# Patient Record
Sex: FEMALE | Race: WHITE | URBAN - METROPOLITAN AREA
[De-identification: names, ages, dates, MRNs, and addresses within clinical notes are randomized per-mention and may not be internally consistent; named-entity substitution may affect disease eponyms.]

---

## 2018-05-01 ENCOUNTER — TELEPHONE (OUTPATIENT)
Dept: OTHER | Facility: CLINIC | Age: 58
End: 2018-05-01

## 2025-01-30 ENCOUNTER — APPOINTMENT (OUTPATIENT)
Dept: ULTRASOUND IMAGING | Facility: CLINIC | Age: 65
DRG: 392 | End: 2025-01-30
Attending: EMERGENCY MEDICINE

## 2025-01-30 ENCOUNTER — APPOINTMENT (OUTPATIENT)
Dept: CT IMAGING | Facility: CLINIC | Age: 65
DRG: 392 | End: 2025-01-30
Attending: EMERGENCY MEDICINE

## 2025-01-30 ENCOUNTER — HOSPITAL ENCOUNTER (INPATIENT)
Facility: CLINIC | Age: 65
End: 2025-01-30
Attending: EMERGENCY MEDICINE | Admitting: STUDENT IN AN ORGANIZED HEALTH CARE EDUCATION/TRAINING PROGRAM

## 2025-01-30 VITALS
SYSTOLIC BLOOD PRESSURE: 177 MMHG | OXYGEN SATURATION: 95 % | RESPIRATION RATE: 18 BRPM | DIASTOLIC BLOOD PRESSURE: 97 MMHG | HEART RATE: 74 BPM | WEIGHT: 171.8 LBS | TEMPERATURE: 97.9 F | HEIGHT: 61 IN | BODY MASS INDEX: 32.44 KG/M2

## 2025-01-30 DIAGNOSIS — K57.20 DIVERTICULITIS OF COLON WITH PERFORATION: ICD-10-CM

## 2025-01-30 DIAGNOSIS — K63.9 COLON WALL THICKENING: ICD-10-CM

## 2025-01-30 DIAGNOSIS — I10 HYPERTENSION, UNSPECIFIED TYPE: Primary | ICD-10-CM

## 2025-01-30 LAB
ALBUMIN SERPL BCG-MCNC: 3.7 G/DL (ref 3.5–5.2)
ALBUMIN UR-MCNC: NEGATIVE MG/DL
ALP SERPL-CCNC: 104 U/L (ref 40–150)
ALT SERPL W P-5'-P-CCNC: 21 U/L (ref 0–50)
ANION GAP SERPL CALCULATED.3IONS-SCNC: 12 MMOL/L (ref 7–15)
APPEARANCE UR: CLEAR
AST SERPL W P-5'-P-CCNC: 26 U/L (ref 0–45)
BACTERIA #/AREA URNS HPF: ABNORMAL /HPF
BASOPHILS # BLD AUTO: 0.1 10E3/UL (ref 0–0.2)
BASOPHILS NFR BLD AUTO: 1 %
BILIRUB SERPL-MCNC: 0.4 MG/DL
BILIRUB UR QL STRIP: NEGATIVE
BUN SERPL-MCNC: 9.2 MG/DL (ref 8–23)
CALCIUM SERPL-MCNC: 9.4 MG/DL (ref 8.8–10.4)
CHLORIDE SERPL-SCNC: 94 MMOL/L (ref 98–107)
COLOR UR AUTO: YELLOW
CREAT SERPL-MCNC: 0.69 MG/DL (ref 0.51–0.95)
EGFRCR SERPLBLD CKD-EPI 2021: >90 ML/MIN/1.73M2
EOSINOPHIL # BLD AUTO: 0.1 10E3/UL (ref 0–0.7)
EOSINOPHIL NFR BLD AUTO: 1 %
ERYTHROCYTE [DISTWIDTH] IN BLOOD BY AUTOMATED COUNT: 13.2 % (ref 10–15)
GLUCOSE SERPL-MCNC: 107 MG/DL (ref 70–99)
GLUCOSE UR STRIP-MCNC: NEGATIVE MG/DL
HCO3 SERPL-SCNC: 28 MMOL/L (ref 22–29)
HCT VFR BLD AUTO: 41.7 % (ref 35–47)
HGB BLD-MCNC: 13.6 G/DL (ref 11.7–15.7)
HGB UR QL STRIP: NEGATIVE
HOLD SPECIMEN: NORMAL
HOLD SPECIMEN: NORMAL
IMM GRANULOCYTES # BLD: 0.1 10E3/UL
IMM GRANULOCYTES NFR BLD: 0 %
KETONES UR STRIP-MCNC: 40 MG/DL
LEUKOCYTE ESTERASE UR QL STRIP: NEGATIVE
LIPASE SERPL-CCNC: 16 U/L (ref 13–60)
LYMPHOCYTES # BLD AUTO: 2.7 10E3/UL (ref 0.8–5.3)
LYMPHOCYTES NFR BLD AUTO: 21 %
MCH RBC QN AUTO: 27.9 PG (ref 26.5–33)
MCHC RBC AUTO-ENTMCNC: 32.6 G/DL (ref 31.5–36.5)
MCV RBC AUTO: 86 FL (ref 78–100)
MONOCYTES # BLD AUTO: 1.4 10E3/UL (ref 0–1.3)
MONOCYTES NFR BLD AUTO: 11 %
MUCOUS THREADS #/AREA URNS LPF: PRESENT /LPF
NEUTROPHILS # BLD AUTO: 8.3 10E3/UL (ref 1.6–8.3)
NEUTROPHILS NFR BLD AUTO: 65 %
NITRATE UR QL: NEGATIVE
NRBC # BLD AUTO: 0 10E3/UL
NRBC BLD AUTO-RTO: 0 /100
PH UR STRIP: 6 [PH] (ref 5–7)
PLATELET # BLD AUTO: 383 10E3/UL (ref 150–450)
POTASSIUM SERPL-SCNC: 3.9 MMOL/L (ref 3.4–5.3)
PROT SERPL-MCNC: 7.3 G/DL (ref 6.4–8.3)
RADIOLOGIST FLAGS: ABNORMAL
RBC # BLD AUTO: 4.88 10E6/UL (ref 3.8–5.2)
RBC URINE: 3 /HPF
SODIUM SERPL-SCNC: 134 MMOL/L (ref 135–145)
SP GR UR STRIP: 1.02 (ref 1–1.03)
SQUAMOUS EPITHELIAL: 1 /HPF
UROBILINOGEN UR STRIP-MCNC: 2 MG/DL
WBC # BLD AUTO: 12.7 10E3/UL (ref 4–11)
WBC URINE: 2 /HPF

## 2025-01-30 PROCEDURE — 250N000011 HC RX IP 250 OP 636: Performed by: EMERGENCY MEDICINE

## 2025-01-30 PROCEDURE — 99207 PR APP CREDIT; MD BILLING SHARED VISIT: CPT | Mod: FS

## 2025-01-30 PROCEDURE — 74177 CT ABD & PELVIS W/CONTRAST: CPT

## 2025-01-30 PROCEDURE — 99285 EMERGENCY DEPT VISIT HI MDM: CPT | Mod: 25

## 2025-01-30 PROCEDURE — 83690 ASSAY OF LIPASE: CPT | Performed by: EMERGENCY MEDICINE

## 2025-01-30 PROCEDURE — 99223 1ST HOSP IP/OBS HIGH 75: CPT | Performed by: STUDENT IN AN ORGANIZED HEALTH CARE EDUCATION/TRAINING PROGRAM

## 2025-01-30 PROCEDURE — 96374 THER/PROPH/DIAG INJ IV PUSH: CPT | Mod: 59

## 2025-01-30 PROCEDURE — 85041 AUTOMATED RBC COUNT: CPT | Performed by: EMERGENCY MEDICINE

## 2025-01-30 PROCEDURE — 76705 ECHO EXAM OF ABDOMEN: CPT

## 2025-01-30 PROCEDURE — 36415 COLL VENOUS BLD VENIPUNCTURE: CPT | Performed by: EMERGENCY MEDICINE

## 2025-01-30 PROCEDURE — 85014 HEMATOCRIT: CPT | Performed by: EMERGENCY MEDICINE

## 2025-01-30 PROCEDURE — 120N000001 HC R&B MED SURG/OB

## 2025-01-30 PROCEDURE — 85004 AUTOMATED DIFF WBC COUNT: CPT | Performed by: EMERGENCY MEDICINE

## 2025-01-30 PROCEDURE — 99253 IP/OBS CNSLTJ NEW/EST LOW 45: CPT | Mod: FS | Performed by: COLON & RECTAL SURGERY

## 2025-01-30 PROCEDURE — 250N000011 HC RX IP 250 OP 636: Mod: JZ | Performed by: EMERGENCY MEDICINE

## 2025-01-30 PROCEDURE — 258N000003 HC RX IP 258 OP 636: Performed by: HOSPITALIST

## 2025-01-30 PROCEDURE — 250N000009 HC RX 250: Performed by: EMERGENCY MEDICINE

## 2025-01-30 PROCEDURE — 250N000013 HC RX MED GY IP 250 OP 250 PS 637: Performed by: STUDENT IN AN ORGANIZED HEALTH CARE EDUCATION/TRAINING PROGRAM

## 2025-01-30 PROCEDURE — 81001 URINALYSIS AUTO W/SCOPE: CPT | Performed by: EMERGENCY MEDICINE

## 2025-01-30 PROCEDURE — 80053 COMPREHEN METABOLIC PANEL: CPT | Performed by: EMERGENCY MEDICINE

## 2025-01-30 RX ORDER — CALCIUM CARBONATE 500 MG/1
1000 TABLET, CHEWABLE ORAL 4 TIMES DAILY PRN
Status: ACTIVE | OUTPATIENT
Start: 2025-01-30

## 2025-01-30 RX ORDER — SODIUM CHLORIDE, SODIUM LACTATE, POTASSIUM CHLORIDE, CALCIUM CHLORIDE 600; 310; 30; 20 MG/100ML; MG/100ML; MG/100ML; MG/100ML
INJECTION, SOLUTION INTRAVENOUS CONTINUOUS
Status: ACTIVE | OUTPATIENT
Start: 2025-01-30

## 2025-01-30 RX ORDER — ACETAMINOPHEN 325 MG/1
975 TABLET ORAL EVERY 8 HOURS PRN
Status: DISPENSED | OUTPATIENT
Start: 2025-01-30

## 2025-01-30 RX ORDER — IPRATROPIUM BROMIDE AND ALBUTEROL SULFATE 2.5; .5 MG/3ML; MG/3ML
3 SOLUTION RESPIRATORY (INHALATION) EVERY 4 HOURS PRN
Status: ACTIVE | OUTPATIENT
Start: 2025-01-30

## 2025-01-30 RX ORDER — ALBUTEROL SULFATE 90 UG/1
2 INHALANT RESPIRATORY (INHALATION) EVERY 6 HOURS PRN
COMMUNITY
End: 2025-01-30

## 2025-01-30 RX ORDER — LIDOCAINE 40 MG/G
CREAM TOPICAL
Status: ACTIVE | OUTPATIENT
Start: 2025-01-30

## 2025-01-30 RX ORDER — ONDANSETRON 2 MG/ML
4 INJECTION INTRAMUSCULAR; INTRAVENOUS ONCE
Status: COMPLETED | OUTPATIENT
Start: 2025-01-30 | End: 2025-01-30

## 2025-01-30 RX ORDER — IOPAMIDOL 755 MG/ML
500 INJECTION, SOLUTION INTRAVASCULAR ONCE
Status: COMPLETED | OUTPATIENT
Start: 2025-01-30 | End: 2025-01-30

## 2025-01-30 RX ORDER — MORPHINE SULFATE 4 MG/ML
4 INJECTION, SOLUTION INTRAMUSCULAR; INTRAVENOUS ONCE
Status: COMPLETED | OUTPATIENT
Start: 2025-01-30 | End: 2025-01-30

## 2025-01-30 RX ORDER — PIPERACILLIN SODIUM, TAZOBACTAM SODIUM 4; .5 G/20ML; G/20ML
4.5 INJECTION, POWDER, LYOPHILIZED, FOR SOLUTION INTRAVENOUS EVERY 6 HOURS
Status: DISPENSED | OUTPATIENT
Start: 2025-01-30

## 2025-01-30 RX ORDER — AMOXICILLIN 250 MG
2 CAPSULE ORAL 2 TIMES DAILY PRN
Status: ACTIVE | OUTPATIENT
Start: 2025-01-30

## 2025-01-30 RX ORDER — AMOXICILLIN 250 MG
1 CAPSULE ORAL 2 TIMES DAILY PRN
Status: ACTIVE | OUTPATIENT
Start: 2025-01-30

## 2025-01-30 RX ORDER — OXYCODONE HYDROCHLORIDE 5 MG/1
5 TABLET ORAL EVERY 4 HOURS PRN
Status: DISPENSED | OUTPATIENT
Start: 2025-01-30

## 2025-01-30 RX ADMIN — PIPERACILLIN AND TAZOBACTAM 4.5 G: 4; .5 INJECTION, POWDER, FOR SOLUTION INTRAVENOUS at 22:04

## 2025-01-30 RX ADMIN — ACETAMINOPHEN 975 MG: 325 TABLET, FILM COATED ORAL at 19:56

## 2025-01-30 RX ADMIN — SODIUM CHLORIDE, POTASSIUM CHLORIDE, SODIUM LACTATE AND CALCIUM CHLORIDE: 600; 310; 30; 20 INJECTION, SOLUTION INTRAVENOUS at 10:21

## 2025-01-30 RX ADMIN — PIPERACILLIN AND TAZOBACTAM 4.5 G: 4; .5 INJECTION, POWDER, FOR SOLUTION INTRAVENOUS at 05:39

## 2025-01-30 RX ADMIN — PIPERACILLIN AND TAZOBACTAM 4.5 G: 4; .5 INJECTION, POWDER, FOR SOLUTION INTRAVENOUS at 17:26

## 2025-01-30 RX ADMIN — OXYCODONE HYDROCHLORIDE 5 MG: 5 TABLET ORAL at 13:10

## 2025-01-30 RX ADMIN — PIPERACILLIN AND TAZOBACTAM 4.5 G: 4; .5 INJECTION, POWDER, FOR SOLUTION INTRAVENOUS at 09:41

## 2025-01-30 RX ADMIN — MORPHINE SULFATE 4 MG: 4 INJECTION, SOLUTION INTRAMUSCULAR; INTRAVENOUS at 02:09

## 2025-01-30 RX ADMIN — SODIUM CHLORIDE, POTASSIUM CHLORIDE, SODIUM LACTATE AND CALCIUM CHLORIDE: 600; 310; 30; 20 INJECTION, SOLUTION INTRAVENOUS at 22:47

## 2025-01-30 RX ADMIN — IOPAMIDOL 86 ML: 755 INJECTION, SOLUTION INTRAVENOUS at 03:21

## 2025-01-30 RX ADMIN — SODIUM CHLORIDE 62 ML: 9 INJECTION, SOLUTION INTRAVENOUS at 03:22

## 2025-01-30 ASSESSMENT — ACTIVITIES OF DAILY LIVING (ADL)
ADLS_ACUITY_SCORE: 41
ADLS_ACUITY_SCORE: 42
ADLS_ACUITY_SCORE: 42
ADLS_ACUITY_SCORE: 41
ADLS_ACUITY_SCORE: 41
ADLS_ACUITY_SCORE: 42
ADLS_ACUITY_SCORE: 42
ADLS_ACUITY_SCORE: 41
ADLS_ACUITY_SCORE: 18
ADLS_ACUITY_SCORE: 42
ADLS_ACUITY_SCORE: 42
ADLS_ACUITY_SCORE: 41
ADLS_ACUITY_SCORE: 18
ADLS_ACUITY_SCORE: 42
ADLS_ACUITY_SCORE: 41
ADLS_ACUITY_SCORE: 42

## 2025-01-30 ASSESSMENT — COLUMBIA-SUICIDE SEVERITY RATING SCALE - C-SSRS
6. HAVE YOU EVER DONE ANYTHING, STARTED TO DO ANYTHING, OR PREPARED TO DO ANYTHING TO END YOUR LIFE?: NO
1. IN THE PAST MONTH, HAVE YOU WISHED YOU WERE DEAD OR WISHED YOU COULD GO TO SLEEP AND NOT WAKE UP?: NO
1. IN THE PAST MONTH, HAVE YOU WISHED YOU WERE DEAD OR WISHED YOU COULD GO TO SLEEP AND NOT WAKE UP?: NO
2. HAVE YOU ACTUALLY HAD ANY THOUGHTS OF KILLING YOURSELF IN THE PAST MONTH?: NO
6. HAVE YOU EVER DONE ANYTHING, STARTED TO DO ANYTHING, OR PREPARED TO DO ANYTHING TO END YOUR LIFE?: NO
2. HAVE YOU ACTUALLY HAD ANY THOUGHTS OF KILLING YOURSELF IN THE PAST MONTH?: NO

## 2025-01-30 NOTE — ED NOTES
[facility]  ED Nurse Handoff Report    ED Chief complaint: Abdominal Pain  . ED Diagnosis:   Final diagnoses:   Diverticulitis of colon with perforation   Colon wall thickening - Needs follow up for mass.        Allergies:   Allergies   Allergen Reactions    Codeine        Code Status: Full Code    Activity level - Baseline/Home:  independent.  Activity Level - Current:   assist of 1.   Lift room needed: No.   Bariatric: No   Needed: No   Isolation: No.   Infection: Not Applicable.     Respiratory status: Room air    Vital Signs (within 30 minutes):   Vitals:    01/30/25 0013 01/30/25 0310   BP: (!) 170/107 (!) 179/88   Pulse: 95 87   Resp: 20    Temp: 98  F (36.7  C)    TempSrc: Temporal    SpO2: 98% 94%   Weight: 78.2 kg (172 lb 6.4 oz)        Cardiac Rhythm:  ,      Pain level:    Patient confused: No.   Patient Falls Risk: nonskid shoes/slippers when out of bed, patient and family education, and activity supervised.   Elimination Status: Has voided     Patient Report - Initial Complaint: Abdominal Pain.   Focused Assessment:  Jacqueline A Goodell is a 64 year old female with history as noted below who presents to the ED with her spouse for evaluation of abdominal pain. The patient reports she felt constipated one week ago (1/23/25) and took Senokot which did relieve the constipation. After this, she developed diffuse abdominal pain and states her abdomen was alternating between feeling hard and soft. She notes the pain is worst in the RUQ. Debbie states she cannot eat or drink due to persistent pain. She denies vomiting, bloody stools, diarrhea, urinary symptoms, or previous abdominal surgeries.          Abnormal Results:   Labs Ordered and Resulted from Time of ED Arrival to Time of ED Departure   COMPREHENSIVE METABOLIC PANEL - Abnormal       Result Value    Sodium 134 (*)     Potassium 3.9      Carbon Dioxide (CO2) 28      Anion Gap 12      Urea Nitrogen 9.2       Creatinine 0.69      GFR Estimate >90      Calcium 9.4      Chloride 94 (*)     Glucose 107 (*)     Alkaline Phosphatase 104      AST 26      ALT 21      Protein Total 7.3      Albumin 3.7      Bilirubin Total 0.4     CBC WITH PLATELETS AND DIFFERENTIAL - Abnormal    WBC Count 12.7 (*)     RBC Count 4.88      Hemoglobin 13.6      Hematocrit 41.7      MCV 86      MCH 27.9      MCHC 32.6      RDW 13.2      Platelet Count 383      % Neutrophils 65      % Lymphocytes 21      % Monocytes 11      % Eosinophils 1      % Basophils 1      % Immature Granulocytes 0      NRBCs per 100 WBC 0      Absolute Neutrophils 8.3      Absolute Lymphocytes 2.7      Absolute Monocytes 1.4 (*)     Absolute Eosinophils 0.1      Absolute Basophils 0.1      Absolute Immature Granulocytes 0.1      Absolute NRBCs 0.0     LIPASE - Normal    Lipase 16     ROUTINE UA WITH MICROSCOPIC        CT Abdomen Pelvis w Contrast   Preliminary Result   Abnormal   IMPRESSION:    1.  Acute sigmoid diverticulitis with a small intramural phlegmon, not amenable to percutaneous drainage. No mechanical bowel obstruction, free gas, free fluid or abscess.      2.  Short segment distal transverse colonic thickening, worrisome for an infiltrative primary colonic mass. This should be evaluated further with endoscopy following resolution of acute sigmoid diverticulitis.      3.  Tiny hepatic cysts or hemangiomas in the right hepatic dome, these require no specific follow-up. Hypodense right hepatic lobe cyst seen previously has resolved. Distended gallbladder without stones, biliary dilatation or acute inflammatory changes.      4.  Slight left lower lobe infiltrate medially adjacent to the descending thoracic aorta, possibly an infectious or an inflammatory process.      5.  Findings discussed with the referring physician, Dr. Cat, immediately following the study at 0350 hours.         [Critical Result: Acute sigmoid diverticulitis with an intramural small phlegmon,  not amenable to percutaneous drainage. Thickening of the distal transverse colon, worrisome for a primary colonic mass. This should be evaluated further with endoscopy    following resolution of acute sigmoid diverticulitis.]      Finding was identified on 1/30/2025 3:30 AM CST.       Dr. Cat was contacted by me on 1/30/2025 3:46 AM CST and verbalized understanding of the critical result.       US Abdomen Limited   Final Result   IMPRESSION:   1.  Mild cholelithiasis with no pericholecystic fluid, positive Moody's sign, or wall thickening.      2.  Normal caliber bile ducts.      3.  The liver, right kidney, and visualized portions the pancreas are normal.                Treatments provided: See MAR  Family Comments: NA  OBS brochure/video discussed/provided to patient:  N/A  ED Medications:   Medications   piperacillin-tazobactam (ZOSYN) 4.5 g vial to attach to  mL bag (has no administration in time range)   morphine (PF) injection 4 mg (4 mg Intravenous $Given 1/30/25 0209)   ondansetron (ZOFRAN) injection 4 mg (4 mg Intravenous Not Given 1/30/25 0357)   CT Scan Flush (62 mLs Intravenous $Given 1/30/25 0322)   iopamidol (ISOVUE-370) solution 500 mL (86 mLs Intravenous $Given 1/30/25 0321)       Drips infusing:  No  For the majority of the shift this patient was Green.   Interventions performed were NA.    Sepsis treatment initiated: No    Cares/treatment/interventions/medications to be completed following ED care: See Orders    ED Nurse Name: Rochelle Mortensen RN  3:59 AM          RECEIVING UNIT ED HANDOFF REVIEW    Above ED Nurse Handoff Report was reviewed: Yes  Reviewed by: Parth Rebolledo RN on January 30, 2025 at 9:00 PM   KAREN Shaw called the ED to inform them the note was read: No

## 2025-01-30 NOTE — PHARMACY-ADMISSION MEDICATION HISTORY
Pharmacist Admission Medication History    Admission medication history is complete. The information provided in this note is only as accurate as the sources available at the time of the update.    Information Source(s): Patient and CareEverywhere/SureScripts via in-person    Pertinent Information: Patient does not have an albuterol inhaler at home so I removed this from her PTA med list as she is not taking currently taking this.     Changes made to PTA medication list:  Added: None  Deleted: albuterol HFA  Changed: None    Allergies reviewed with patient and updates made in EHR: no    Medication History Completed By: SAILAJA POTTER RPH 1/30/2025 8:45 AM    No outpatient medications have been marked as taking for the 1/30/25 encounter (Hospital Encounter).

## 2025-01-30 NOTE — ED NOTES
[facility]    ED Boarding Nurse Handoff Addendum Report:    Date/time: 1/30/2025, 1:18 PM    Activity Level: standby    Fall Risk: Yes:  activity supervised    Active Infusions: LR @ 100 ml/hr    Current Meds Due: N/A    Current care needs: Would like a room    Oxygen requirements (liters/min and/or FiO2): N/A    Respiratory status: Room air    Vital signs (within last 30 minutes):    Vitals:    01/30/25 0618 01/30/25 0940 01/30/25 0945 01/30/25 1207   BP: (!) 172/95 (!) 166/99  (!) 166/93   BP Location: Left arm      Pulse: 85 79  85   Resp: 22      Temp: 98.1  F (36.7  C)      TempSrc: Oral      SpO2: 96%  93% 97%   Weight:           ED Boarding Nurse name: Mariela Armenta RN

## 2025-01-30 NOTE — CONSULTS
[facility]   Consultation      Place of Service: [facility]  Reason for Consultation:Sigmoid diverticulitis with phlegmon, possible infiltrative colon mass   Consult Requested by: Dr. Bart Gorman    History of Present Illness: Jacqueline A Goodell is a 63 yo F with a history of smoking, diverticulitis in the past who presents for evaluation of abdominal pain. She recalled diffuse abdominal pain that began 1 week ago after taking senna for constipation. She notes that the pain is intermittent. She denies any nausea or vomiting. Poor oral intake for the past week. She presented to the ED and was found to be vitally stable although with blood pressure 170/107. CT showed acute sigmoid diverticulitis with a small intramural phlegmon, short segment distal transverse colonic thickening concerning for infiltrative primary colonic mass. Labs significant for WBC 12.7, hemoglobin 13.6, platelet 383, sodium 134, chloride 94, creatinine 0.69. She was admitted and started on IV zosyn and admitted.     Today, she reports that she is feeling much better since she was admitted. Her last bowel movement was about a week ago. She is concerned as she does not have insurance.     She has never had a colonoscopy.    No prior abdominal surgery.  She has a history of diverticulitis which was over a decade ago.    Pertinent Labs:   Lab Results: personally reviewed   Lab Results   Component Value Date     01/30/2025     06/17/2011    CO2 28 01/30/2025    CO2 28 06/17/2011    BUN 9.2 01/30/2025    BUN 15 06/17/2011     Lab Results   Component Value Date    WBC 12.7 01/30/2025    WBC 7.9 06/17/2011    HGB 13.6 01/30/2025    HGB 11.1 06/17/2011    HCT 41.7 01/30/2025    HCT 34.0 06/17/2011    MCV 86 01/30/2025    MCV 89 06/17/2011     01/30/2025     06/17/2011       Pertinent Radiology:     EXAM: CT ABDOMEN PELVIS W CONTRAST  LOCATION: [facility]  DATE:  1/30/2025     INDICATION: Right sided abdominal pain, leukocytosis, gallstones seen on ultrasound.  COMPARISON:   1. Ultrasound abdomen limited 1/30/2025.  2. CT abdomen and pelvis with IV contrast 6/17/2011.  TECHNIQUE: CT scan of the abdomen and pelvis was performed following injection of IV contrast. Multiplanar reformats were obtained. Dose reduction techniques were used.  CONTRAST: 86 mL Isovue 370.     FINDINGS:   LOWER CHEST: Slight left lower lobe infiltrate medially adjacent to the descending thoracic aorta. No pleural effusion on either side. Normal cardiac size. No pericardial effusion.     HEPATOBILIARY: Focal fat in the usual location in the left hepatic lobe anteroinferiorly. A few tiny cysts or hemangiomas in the right hepatic dome, no specific follow-up required. Resolved hypodense cyst in the right hepatic lobe seen previously.   Distended gallbladder without calcified stones, biliary dilatation or adjacent acute inflammatory changes. Patent hepatic and portal veins.     PANCREAS: Normal.     SPLEEN: Normal.     ADRENAL GLANDS: Normal.     KIDNEYS/BLADDER: No urinary tract calculi. Both kidneys are well-perfused without hydronephrosis or hydroureter. Normal urinary bladder.     BOWEL: Acute sigmoid diverticulitis (images 137-154, series 3), with a small intramural phlegmon measuring 2.1 x 0.7 cm (image 61, series 4, images 74-76, series 5, images 146-148, series 3), not amenable to percutaneous drainage. There is a short   segment distal transverse colonic thickening (images 23-35, series 4, images , series 3), worrisome for an infiltrative primary colonic mass. Moderate amount of formed stool material within normal caliber colon. Normal appendix. Tiny hiatal hernia.     LYMPH NODES: A few small retroperitoneal nodes, a representative node measures 1.3 x 0.8 cm (image 88, series 3). No suspicious abdominopelvic adenopathy.     VASCULATURE: Atherosclerotic normal caliber distal abdominal aorta  measuring 1.7 x 1.7 cm (image 89, series 3). Normal caliber IVC.     PELVIC ORGANS: Acute sigmoid diverticulitis with a small intramural phlegmon. Anteverted postmenopausal uterus. No adenopathy or free fluid.     MUSCULOSKELETAL: Degenerative changes involving the spine and joints of the pelvis. Small fat-containing ventral umbilical hernia.                                                                      IMPRESSION:   1.  Acute sigmoid diverticulitis with a small intramural phlegmon, not amenable to percutaneous drainage. No mechanical bowel obstruction, free gas, free fluid or abscess.     2.  Short segment distal transverse colonic thickening, worrisome for an infiltrative primary colonic mass. This should be evaluated further with endoscopy following resolution of acute sigmoid diverticulitis.     3.  Tiny hepatic cysts or hemangiomas in the right hepatic dome, these require no specific follow-up. Hypodense right hepatic lobe cyst seen previously has resolved. Distended gallbladder without stones, biliary dilatation or acute inflammatory changes.     4.  Slight left lower lobe infiltrate medially adjacent to the descending thoracic aorta, possibly an infectious or an inflammatory process.     5.  Findings discussed with the referring physician, Dr. Cat, immediately following the study at 0350 hours.        [Critical Result: Acute sigmoid diverticulitis with an intramural small phlegmon, not amenable to percutaneous drainage. Thickening of the distal transverse colon, worrisome for a primary colonic mass. This should be evaluated further with endoscopy   following resolution of acute sigmoid diverticulitis.]     Finding was identified on 1/30/2025 3:30 AM CST.            Past Medical History:   Diagnosis Date    Asthma     Cholelithiasis     Depressive disorder     Diverticular disease of colon        Past Surgical History:   Procedure Laterality Date    MAMMOPLASTY AUGMENTATION      Tubal Ligation NOS  Bilateral        No family history on file.    Social History     Socioeconomic History    Marital status:      Spouse name: Not on file    Number of children: Not on file    Years of education: Not on file    Highest education level: Not on file   Occupational History    Not on file   Tobacco Use    Smoking status: Not on file    Smokeless tobacco: Not on file   Substance and Sexual Activity    Alcohol use: Not on file    Drug use: Not on file    Sexual activity: Not on file   Other Topics Concern    Not on file   Social History Narrative    Not on file     Social Drivers of Health     Financial Resource Strain: Not on file   Food Insecurity: Not on file   Transportation Needs: Not on file   Physical Activity: Not on file   Stress: Not on file   Social Connections: Not on file   Interpersonal Safety: Not on file   Housing Stability: Not on file       Current Facility-Administered Medications   Medication Dose Route Frequency Provider Last Rate Last Admin    acetaminophen (TYLENOL) tablet 975 mg  975 mg Oral Q8H PRN Kianna Guzman MD        calcium carbonate (TUMS) chewable tablet 1,000 mg  1,000 mg Oral 4x Daily PRN Kianna Guzman MD        ipratropium - albuterol 0.5 mg/2.5 mg/3 mL (DUONEB) neb solution 3 mL  3 mL Nebulization Q4H PRN Kianna Guzman MD        lactated ringers infusion   Intravenous Continuous Bart Gorman  mL/hr at 01/30/25 1021 New Bag at 01/30/25 1021    lidocaine (LMX4) cream   Topical Q1H PRKianna Tena MD        lidocaine 1 % 0.1-1 mL  0.1-1 mL Other Q1H Kianna Crawford MD        oxyCODONE (ROXICODONE) tablet 5 mg  5 mg Oral Q4H PRKianna Tena MD        piperacillin-tazobactam (ZOSYN) 4.5 g vial to attach to  mL bag  4.5 g Intravenous Q6H Bart Cat MD   Stopped at 01/30/25 1021    senna-docusate (SENOKOT-S/PERICOLACE) 8.6-50 MG per tablet 1 tablet  1 tablet Oral BID PRKianna Tena MD        Or    senna-docusate (SENOKOT-S/PERICOLACE) 8.6-50 MG per tablet 2 tablet  2  tablet Oral BID PRN Kianna Guzman MD        sodium chloride (PF) 0.9% PF flush 3 mL  3 mL Intracatheter Q8H Kianna Guzman MD   3 mL at 01/30/25 0537    sodium chloride (PF) 0.9% PF flush 3 mL  3 mL Intracatheter q1 min prn Kianna Guzman MD         No current outpatient medications on file.       Allergies   Allergen Reactions    Codeine          Intake/Output past 24 hrs:  No intake or output data in the 24 hours ending 01/30/25 1048    Physical Exam:  Temp:  [98  F (36.7  C)-98.1  F (36.7  C)] 98.1  F (36.7  C)  Pulse:  [79-95] 79  Resp:  [20-22] 22  BP: (166-179)/() 166/99  SpO2:  [93 %-98 %] 93 %    General: NAD, alert, cooperative  Head: normocephalic, without abnormality / atraumatic  Respiratory: non-labored breathing  Abdomen: soft, minimal tenderness, non-distended, round belly.  No guarding or rebound  Perianal/Rectal:  Skin: no rashes or lesions  Musculoskeletal: moves all four extremities equally; no calf edema or tenderness  Psychological: alert and oriented, answers questions appropriately  Neurological: cranial nerves grossly intact    Assessment/Plan: Jacqueline A Goodell is a 63 yo F with a history of smoking, diverticulitis in the past who presents for evaluation of abdominal pain. CT showed acute sigmoid diverticulitis with a small intramural phlegmon, short segment distal transverse colonic thickening concerning for infiltrative primary colonic mass. Labs significant for WBC 12.7, hemoglobin 13.6, platelet 383, sodium 134, chloride 94, creatinine 0.69. She was started on IV zosyn and admitted. Recommend continuing with conservative medical management including bowel rest, IVF, and IV antibiotics. Briefly discussed that if she did not improve or were to acutely worsen she may require more urgent surgery which would likely entail a sigmoid resection with a possible temporary stoma. Regardless would recommend a colonoscopy in 6-8 weeks given concern for primary colonic mass on CT scan. Social work  consult to assist. Continue supportive cares. We will continue to follow along.        Plan:  Admit to hospitalist  Surgery: No emergent surgery indicated  Diet: Clear liquids  IV Fluids: Per hospitalist  Antibiotics:  IV Zosyn  Medications: Continue home meds per hospitalist  I&O s:  strict I&O s  Labs:   - Reviewed  - Ordered: None   Imaging:   - Dr. Gutierrez and myself have personally viewed: CT abd/pelvis  - Ordered:  None  Activity: ambulate as tolerated, encourage OOB  DVT prophylaxis: SCD s  This plan has been discussed with Dr. Gutierrez    Patient specific identified risk factors considered as part of today s evaluation include: recurrent diverticulitis, socioeconomic status, smoker      Additional history obtained from the chart and patient.  Time spent on consultation: 49 minutes minutes.    Clinically Significant Risk Factors Present on Admission         # Hyponatremia: Lowest Na = 134 mmol/L in last 2 days, will monitor as appropriate  # Hypochloremia: Lowest Cl = 94 mmol/L in last 2 days, will monitor as appropriate                             Frieda Ac PA-C  [facility]  Phone:  [phone]

## 2025-01-30 NOTE — ED PROVIDER NOTES
Emergency Department Note      History of Present Illness     Chief Complaint   Abdominal Pain    HPI   Jacqueline A Goodell is a 64 year old female with history as noted below who presents to the ED with her spouse for evaluation of abdominal pain. The patient reports she felt constipated one week ago (1/23/25) and took Senokot which did relieve the constipation. After this, she developed diffuse abdominal pain and states her abdomen was alternating between feeling hard and soft. She notes the pain is worst in the RUQ. Debbie states she cannot eat or drink due to persistent pain. She denies vomiting, bloody stools, diarrhea, urinary symptoms, or previous abdominal surgeries.       Independent Historian   None    Review of External Notes   I reviewed Care Everywhere and updated Epic.     Past Medical History     Medical History and Problem List   Past Medical History:   Diagnosis Date    Asthma     Depressive disorder      Medications   albuterol (PROAIR HFA/PROVENTIL HFA/VENTOLIN HFA) 108 (90 Base) MCG/ACT inhaler        Surgical History   Past Surgical History:   Procedure Laterality Date    MAMMOPLASTY AUGMENTATION      Tubal Ligation NOS Bilateral        Physical Exam     Patient Vitals for the past 24 hrs:   BP Temp Temp src Pulse Resp SpO2 Weight   01/30/25 0310 179/88 -- -- 87 -- 94 % --   01/30/25 0013 170/107 98  F (36.7  C) Temporal 95 20 98 % 78.2 kg (172 lb 6.4 oz)     Physical Exam  Nursing note and vitals reviewed.  Constitutional: Cooperative.  Uncomfortable appearing  HENT:   Mouth/Throat: Mucous membranes are dry  Cardiovascular: Normal rate, regular rhythm and normal heart sounds.  No murmur.  Pulmonary/Chest: Effort normal and breath sounds normal. No respiratory distress. No wheezes. No rales.   Abdominal: Soft.  Slight distention.  Diffuse abdominal tenderness most prominent in the right upper quadrant.  No guarding or rigidity.  Neurological: Alert.  Oriented x 3  Skin: Skin is warm and  dry.   Psychiatric: Normal mood and affect.      Diagnostics     Lab Results   Labs Ordered and Resulted from Time of ED Arrival to Time of ED Departure   COMPREHENSIVE METABOLIC PANEL - Abnormal       Result Value    Sodium 134 (*)     Potassium 3.9      Carbon Dioxide (CO2) 28      Anion Gap 12      Urea Nitrogen 9.2      Creatinine 0.69      GFR Estimate >90      Calcium 9.4      Chloride 94 (*)     Glucose 107 (*)     Alkaline Phosphatase 104      AST 26      ALT 21      Protein Total 7.3      Albumin 3.7      Bilirubin Total 0.4     CBC WITH PLATELETS AND DIFFERENTIAL - Abnormal    WBC Count 12.7 (*)     RBC Count 4.88      Hemoglobin 13.6      Hematocrit 41.7      MCV 86      MCH 27.9      MCHC 32.6      RDW 13.2      Platelet Count 383      % Neutrophils 65      % Lymphocytes 21      % Monocytes 11      % Eosinophils 1      % Basophils 1      % Immature Granulocytes 0      NRBCs per 100 WBC 0      Absolute Neutrophils 8.3      Absolute Lymphocytes 2.7      Absolute Monocytes 1.4 (*)     Absolute Eosinophils 0.1      Absolute Basophils 0.1      Absolute Immature Granulocytes 0.1      Absolute NRBCs 0.0     ROUTINE UA WITH MICROSCOPIC - Abnormal    Color Urine Yellow      Appearance Urine Clear      Glucose Urine Negative      Bilirubin Urine Negative      Ketones Urine 40 (*)     Specific Gravity Urine 1.020      Blood Urine Negative      pH Urine 6.0      Protein Albumin Urine Negative      Urobilinogen Urine 2.0      Nitrite Urine Negative      Leukocyte Esterase Urine Negative      Bacteria Urine Few (*)     Mucus Urine Present (*)     RBC Urine 3 (*)     WBC Urine 2      Squamous Epithelials Urine 1     LIPASE - Normal    Lipase 16         Imaging   CT Abdomen Pelvis w Contrast   Final Result   Abnormal   IMPRESSION:    1.  Acute sigmoid diverticulitis with a small intramural phlegmon, not amenable to percutaneous drainage. No mechanical bowel obstruction, free gas, free fluid or abscess.      2.  Short  segment distal transverse colonic thickening, worrisome for an infiltrative primary colonic mass. This should be evaluated further with endoscopy following resolution of acute sigmoid diverticulitis.      3.  Tiny hepatic cysts or hemangiomas in the right hepatic dome, these require no specific follow-up. Hypodense right hepatic lobe cyst seen previously has resolved. Distended gallbladder without stones, biliary dilatation or acute inflammatory changes.      4.  Slight left lower lobe infiltrate medially adjacent to the descending thoracic aorta, possibly an infectious or an inflammatory process.      US Abdomen Limited   Final Result   IMPRESSION:   1.  Mild cholelithiasis with no pericholecystic fluid, positive Moody's sign, or wall thickening.      2.  Normal caliber bile ducts.      3.  The liver, right kidney, and visualized portions the pancreas are normal.              Independent Interpretation   None    ED Course      Medications Administered   Medications   piperacillin-tazobactam (ZOSYN) 4.5 g vial to attach to  mL bag (has no administration in time range)   morphine (PF) injection 4 mg (4 mg Intravenous $Given 1/30/25 0209)   ondansetron (ZOFRAN) injection 4 mg (4 mg Intravenous Not Given 1/30/25 0357)   CT Scan Flush (62 mLs Intravenous $Given 1/30/25 0322)   iopamidol (ISOVUE-370) solution 500 mL (86 mLs Intravenous $Given 1/30/25 0321)       Procedures   Procedures     Discussion of Management   Admitting Hospitalist, Dr. Guzman    ED Course   ED Course as of 01/30/25 0412   Thu Jan 30, 2025   0048 I obtained history and examined the patient as noted above.    0251 I rechecked the patient and explained findings. Will obtain CT.    0350 I spoke with [facility] regarding CT Abdomen Pelvis findings. Perforated sigmoid diverticulitis with concern for colonic mass.    0359 I rechecked and updated patient on plan for admission.   0410 I spoke with Dr. Guzman, Hospitalist, regarding the patient's  history and presentation in the emergency department today. Accepting patient for admission.     Additional Documentation  None    Medical Decision Making / Diagnosis     MDM   Jacqueline A Goodell is a 64 year old female who presents with abdominal pain as noted above.  Workup here consistent with acute sigmoid diverticulitis with microperforation and phlegmon development.  She does have a white blood cell count elevation.  This is a small phlegmon likely not amenable to IR drainage at this time per radiology.  IV antibiotics initiated.  No other signs of severe sepsis.  Patient was noted incidentally to have gallstones which I do not believe to be the cause of her symptoms today.  I was also contacted by radiology and made aware that she has fairly significant thickening of her colon.  This is highly concerning for underlying colonic mass.  I talked with the patient later on and she confirms she has never had a colonoscopy.  I made her aware that this is necessary following resolution of this current infection and updated her chart accordingly.  She will be admitted in stable condition    Disposition   The patient was admitted to the hospital.     Diagnosis     ICD-10-CM    1. Diverticulitis of colon with perforation  K57.20       2. Colon wall thickening - Needs follow up for mass.   K63.9         Scribe Disclosure:  I, Ligia Moser, am serving as a scribe at 12:53 AM on 1/30/2025 to document services personally performed by Bart Cat MD based on my observations and the provider's statements to me.        Bart Cat MD  01/30/25 0555

## 2025-01-30 NOTE — PROGRESS NOTES
HOSPITALIST FOLLOW UP NOTE:    The patient was seen and examined, I agree with the history, exam, assessment and plan of Dr Guzman.    64-year-old female with no significant medical history although does not see physicians on a regular basis was admitted overnight with abdominal pain.  She was found to have acute sigmoid diverticulitis with a small intramural phlegmon.  She was started on Zosyn.  CT also showed a short segment of the distal transverse colon with some thickening concerning for an infiltrative primary colonic mass.  I will request CRS consultation to assist with management.    Bart Gorman DO MPH  [facility] Hospitalist  [address] [place] [address]  Pager: [phone]  01/30/2025

## 2025-01-30 NOTE — PLAN OF CARE
"[facility]    ED Boarding Nurse Handoff Addendum Report:    Date/time: 1/30/2025, 6:58 AM    Activity Level: standby    Fall Risk: No    Active Infusions: none    Current Meds Due: none    Current care needs: none    Oxygen requirements (liters/min and/or FiO2): none    Respiratory status: Room air    Vital signs (within last 30 minutes):    Vitals:    01/30/25 0013 01/30/25 0310 01/30/25 0618   BP: (!) 170/107 (!) 179/88 (!) 172/95   BP Location:   Left arm   Pulse: 95 87 85   Resp: 20  22   Temp: 98  F (36.7  C)  98.1  F (36.7  C)   TempSrc: Temporal  Oral   SpO2: 98% 94% 96%   Weight: 78.2 kg (172 lb 6.4 oz)         Focused assessment within last 30 minutes:    Alert and oriented x 4. On RA. Denies pain. Up with SBA. Voiding adequately. PIV saline locked.     ED Boarding Nurse name: Adela Medrano RN     Alert and oriented x 4. On RA. Denies pain. Up with SBA. Voiding adequately. PIV saline locked. Tolerating clear liquids.   Problem: Adult Inpatient Plan of Care  Goal: Plan of Care Review  Description: The Plan of Care Review/Shift note should be completed every shift.  The Outcome Evaluation is a brief statement about your assessment that the patient is improving, declining, or no change.  This information will be displayed automatically on your shift  note.  Outcome: Progressing  Flowsheets (Taken 1/30/2025 0646)  Outcome Evaluation: Alert and oriented x 4. On RA. Denies pain. Up with SBA. Voiding adequately. PIV saline locked. Tolerating clear liquids.  Plan of Care Reviewed With: patient  Overall Patient Progress: improving  Goal: Patient-Specific Goal (Individualized)  Description: You can add care plan individualizations to a care plan. Examples of Individualization might be:  \"Parent requests to be called daily at 9am for status\", \"I have a hard time hearing out of my right ear\", or \"Do not touch me to wake me up as it startles  me\".  Outcome: Progressing  Goal: Absence of " Hospital-Acquired Illness or Injury  Outcome: Progressing  Intervention: Identify and Manage Fall Risk  Recent Flowsheet Documentation  Taken 1/30/2025 0600 by Adela Koch, RN  Safety Promotion/Fall Prevention:   activity supervised   nonskid shoes/slippers when out of bed   patient and family education   room near nurse's station   safety round/check completed  Intervention: Prevent Skin Injury  Recent Flowsheet Documentation  Taken 1/30/2025 0600 by Adela Koch, RN  Body Position:   position maintained   position changed independently  Intervention: Prevent and Manage VTE (Venous Thromboembolism) Risk  Recent Flowsheet Documentation  Taken 1/30/2025 0600 by Adela Koch, EVER  VTE Prevention/Management: SCDs off (sequential compression devices)  Intervention: Prevent Infection  Recent Flowsheet Documentation  Taken 1/30/2025 0600 by Adela Koch, RN  Infection Prevention:   environmental surveillance performed   equipment surfaces disinfected   hand hygiene promoted   cohorting utilized  Goal: Optimal Comfort and Wellbeing  Outcome: Progressing  Intervention: Monitor Pain and Promote Comfort  Recent Flowsheet Documentation  Taken 1/30/2025 0600 by Adela Koch RN  Pain Management Interventions: declines  Goal: Readiness for Transition of Care  Outcome: Progressing     Problem: Pain Acute  Goal: Optimal Pain Control and Function  Outcome: Progressing  Intervention: Develop Pain Management Plan  Recent Flowsheet Documentation  Taken 1/30/2025 0600 by Adela Koch RN  Pain Management Interventions: declines  Intervention: Prevent or Manage Pain  Recent Flowsheet Documentation  Taken 1/30/2025 0600 by Adela Koch, RN  Medication Review/Management: medications reviewed   Goal Outcome Evaluation:      Plan of Care Reviewed With: patient    Overall Patient Progress: improvingOverall Patient Progress: improving    Outcome Evaluation: Alert and oriented x 4.  On RA. Denies pain. Up with SBA. Voiding adequately. PIV saline locked. Tolerating clear liquids.

## 2025-01-30 NOTE — H&P
[facility]    History and Physical - Hospitalist Service       Date of Admission:  1/30/2025    Assessment & Plan      Jacqueline A Goodell is a 64 year old female who does not doctor very often, reported an episode of diverticulitis almost a decade ago, current smoker who presented to the ER for the evaluation of subacute abdominal pain and was found to have acute sigmoid diverticulitis with a small intramural phlegmon.       Reported diffuse abdominal pain for about a week which comes and goes.  No nausea or vomiting.  Able to eat and drink okay.  Upon presentation to the ER patient was afebrile, pulse 95, blood pressure 170/107, saturating well on room air.   Lab workup revealed WBC 12.7, hemoglobin 13.6, platelet 383, sodium 134, chloride 94, creatinine 0.69.  UA showed 3 RBC urine, few bacteria, otherwise no leukocyte esterase or nitrite urine.  Patient underwent CT abdomen and pelvis which revealed acute sigmoid diverticulitis with a small intramural phlegmon, short segment distal transverse colonic thickening concerning for infiltrative primary colonic mass, tiny hepatic cyst/hemangiomas in the right hepatic dome.  The ER patient also complained of right upper quadrant abdominal pain.  Patient underwent ultrasound abdomen which was reassuring and without any evidence of cholecystitis.  Patient was started on IV Zosyn and admitted to the hospital for further management.    Acute sigmoid diverticulitis with a small intramural phlegmon  Reported 1 week history of diffuse abdominal pain.  A week ago she started eating honey binges of oats with season at.  After which she developed constipation.  She took Senokot and her constipation resolved but she developed abdominal pain.  It comes and goes.  Not associated with fever, chills, nausea, or vomiting.  Reported an episode of diverticulitis several years ago.  Noted to have mild leukocytosis upon admission but otherwise hemodynamically  stable.  Abdominal exam is overall reassuring except left abdominal tenderness.  CT findings revealed as mentioned above, no mechanical bowel obstruction, free gas, free fluid or abscess was noted.  - Patient was started on IV Zosyn in the ER, will continue  - Clear liquid diet, if patient is able to tolerate advance diet further  - Anticipate that patient can be transition to oral antibiotics in 1 to 2 days pending clinical improvement  - No evidence of perforation or abscess, intramural phlegmon is not amenable to percutaneous drainage.  Will hold off colorectal surgical consultation at this time.      Concern for infiltrative primary colonic mass  On CT scan patient was noted to have short segment distal transverse colonic thickening which is worrisome for an infiltrative primary colonic mass.  Patient does not doctor often.  She never had a colonoscopy before.  She denied any weight loss.  She is a smoker.  - Patient needs outpatient colonoscopy once she gets better from diverticulitis standpoint  - Patient also need to establish care with PCP, to place a referral in the discharge navigator      Hepatic cysts/hemangiomas  This was noted on the CT.  Tiny hepatic cyst/hemangioma is present in the hepatic dome.  These require no specific follow-up.    Left lower lobe infiltrate  This was noted on the CT scan.  Patient denied any fever, chills, difficulty breathing.  She reported mucus in her throat which she is having difficulty clearing.  This could be suspicious for pneumonia however patient clinically appears stable/well.  Above antibiotics for diverticulitis should also cover pneumonia if she has any.     Current tobacco use  Patient reported smoking but has been weak and about the quantity of cigarettes that she smokes.  Her  says that she smokes for about 2 to 5 cigarettes/day.  She refused nicotine patch while in the hospital.  Counseled on smoking cessation.    Mild hyponatremia  Sodium 134 upon  admission.  Monitor    Diet: Combination Diet Clear Liquid      DVT Prophylaxis: Pneumatic Compression Devices  Stoner Catheter: Not present  Lines: None     Cardiac Monitoring: None  Code Status: No CPR- Do NOT Intubate      Clinically Significant Risk Factors Present on Admission         # Hyponatremia: Lowest Na = 134 mmol/L in last 2 days, will monitor as appropriate  # Hypochloremia: Lowest Cl = 94 mmol/L in last 2 days, will monitor as appropriate                               Disposition Plan     Medically Ready for Discharge: Anticipated in 2-4 Days           Kianna Guzman MD  Hospitalist Service  [facility]  Securely message with Immediately (more info)  Text page via [facility] Paging/Directory     ______________________________________________________________________    Chief Complaint   Subacute abdominal pain    History is obtained from the patient    History of Present Illness       Jacqueline A Goodell is a 64 year old female who does not doctor very often, reported an episode of diverticulitis almost a decade ago, current smoker who presented to the ER for the evaluation of subacute abdominal pain and was found to have acute sigmoid diverticulitis with a small intramural phlegmon.     Reported that she has had an episode of diverticulitis may be about a decade ago.  Patient reported that about a week ago she started eating honey bunches of foods with seeds in it after which she developed constipation.  She took Senokot which helped with the constipation but then she developed abdominal pain.  Abdominal pain is diffuse but more on the left side of the abdomen.  She denied any fever or chills.  She has been able to eat and drink okay.  Bowel movements were also okay.  Pain used to come and go.  She thinks that abdominal pain was related to the food.  No nausea or vomiting.  Since pain was not going away she decided to come to the ER for further evaluation.    He does not doctor very often.   She does not have a PCP.  She never had a colonoscopy.  She is a current active smoker.  She does not drink alcohol.  She has no prior history of hypertension but here she was noted to have elevated blood pressure.     Past Medical History    Past Medical History:   Diagnosis Date    Asthma     Cholelithiasis     Depressive disorder     Diverticular disease of colon        Past Surgical History   Past Surgical History:   Procedure Laterality Date    MAMMOPLASTY AUGMENTATION      Tubal Ligation NOS Bilateral        Prior to Admission Medications   Prior to Admission Medications   Prescriptions Last Dose Informant Patient Reported? Taking?   albuterol (PROAIR HFA/PROVENTIL HFA/VENTOLIN HFA) 108 (90 Base) MCG/ACT inhaler   Yes Yes   Sig: Inhale 2 puffs into the lungs every 6 hours as needed for shortness of breath, wheezing or cough.      Facility-Administered Medications: None        Review of Systems    The 10 point Review of Systems is negative other than noted in the HPI or here.      Physical Exam   Vital Signs: Temp: 98  F (36.7  C) Temp src: Temporal BP: (!) 179/88 Pulse: 87   Resp: 20 SpO2: 94 %      Weight: 172 lbs 6.4 oz    General Appearance: Appears calm, comfortable, not in acute distress  Eyes: Anicteric sclera  HEENT: Normocephalic, atraumatic  Respiratory: Diminished air entry bilaterally, no wheezing or crackles, no respiratory distress, on room air  Cardiovascular: normal rate, regular rhythm, no murmur  GI: Soft, mild tenderness in the left abdomen  Musculoskeletal: Lower extremity edema noted  Neurologic: Alert and oriented x 3, no focal deficit  Psychiatric: Appropriate mood and affect    Medical Decision Making       65 MINUTES SPENT BY ME on the date of service doing chart review, history, exam, documentation & further activities per the note.      Data   ------------------------- PAST 24 HR DATA REVIEWED -----------------------------------------------

## 2025-01-30 NOTE — ED TRIAGE NOTES
Pt to ER with c/o diffuse abd pain , pt states recently had constipation but this feels worse, some huy

## 2025-01-31 LAB
ANION GAP SERPL CALCULATED.3IONS-SCNC: 11 MMOL/L (ref 7–15)
BUN SERPL-MCNC: 5.7 MG/DL (ref 8–23)
CALCIUM SERPL-MCNC: 8.5 MG/DL (ref 8.8–10.4)
CHLORIDE SERPL-SCNC: 101 MMOL/L (ref 98–107)
CREAT SERPL-MCNC: 0.69 MG/DL (ref 0.51–0.95)
EGFRCR SERPLBLD CKD-EPI 2021: >90 ML/MIN/1.73M2
ERYTHROCYTE [DISTWIDTH] IN BLOOD BY AUTOMATED COUNT: 13.1 % (ref 10–15)
GLUCOSE SERPL-MCNC: 95 MG/DL (ref 70–99)
HCO3 SERPL-SCNC: 28 MMOL/L (ref 22–29)
HCT VFR BLD AUTO: 37.7 % (ref 35–47)
HGB BLD-MCNC: 12.2 G/DL (ref 11.7–15.7)
MAGNESIUM SERPL-MCNC: 2 MG/DL (ref 1.7–2.3)
MCH RBC QN AUTO: 28.2 PG (ref 26.5–33)
MCHC RBC AUTO-ENTMCNC: 32.4 G/DL (ref 31.5–36.5)
MCV RBC AUTO: 87 FL (ref 78–100)
PLATELET # BLD AUTO: 344 10E3/UL (ref 150–450)
POTASSIUM SERPL-SCNC: 3.3 MMOL/L (ref 3.4–5.3)
POTASSIUM SERPL-SCNC: 3.9 MMOL/L (ref 3.4–5.3)
RBC # BLD AUTO: 4.33 10E6/UL (ref 3.8–5.2)
SODIUM SERPL-SCNC: 140 MMOL/L (ref 135–145)
WBC # BLD AUTO: 8.7 10E3/UL (ref 4–11)

## 2025-01-31 PROCEDURE — 36415 COLL VENOUS BLD VENIPUNCTURE: CPT | Performed by: HOSPITALIST

## 2025-01-31 PROCEDURE — 84132 ASSAY OF SERUM POTASSIUM: CPT

## 2025-01-31 PROCEDURE — 250N000013 HC RX MED GY IP 250 OP 250 PS 637

## 2025-01-31 PROCEDURE — 120N000001 HC R&B MED SURG/OB

## 2025-01-31 PROCEDURE — 85014 HEMATOCRIT: CPT | Performed by: HOSPITALIST

## 2025-01-31 PROCEDURE — 258N000003 HC RX IP 258 OP 636

## 2025-01-31 PROCEDURE — 258N000003 HC RX IP 258 OP 636: Performed by: HOSPITALIST

## 2025-01-31 PROCEDURE — 82565 ASSAY OF CREATININE: CPT | Performed by: HOSPITALIST

## 2025-01-31 PROCEDURE — 80048 BASIC METABOLIC PNL TOTAL CA: CPT | Performed by: HOSPITALIST

## 2025-01-31 PROCEDURE — 36415 COLL VENOUS BLD VENIPUNCTURE: CPT

## 2025-01-31 PROCEDURE — 83735 ASSAY OF MAGNESIUM: CPT

## 2025-01-31 PROCEDURE — 250N000011 HC RX IP 250 OP 636: Performed by: EMERGENCY MEDICINE

## 2025-01-31 PROCEDURE — 99233 SBSQ HOSP IP/OBS HIGH 50: CPT

## 2025-01-31 RX ORDER — LISINOPRIL 5 MG/1
5 TABLET ORAL DAILY
Status: DISCONTINUED | OUTPATIENT
Start: 2025-01-31 | End: 2025-02-01

## 2025-01-31 RX ORDER — POTASSIUM CHLORIDE 1500 MG/1
40 TABLET, EXTENDED RELEASE ORAL ONCE
Status: COMPLETED | OUTPATIENT
Start: 2025-01-31 | End: 2025-01-31

## 2025-01-31 RX ORDER — SODIUM CHLORIDE, SODIUM LACTATE, POTASSIUM CHLORIDE, CALCIUM CHLORIDE 600; 310; 30; 20 MG/100ML; MG/100ML; MG/100ML; MG/100ML
INJECTION, SOLUTION INTRAVENOUS CONTINUOUS
Status: ACTIVE | OUTPATIENT
Start: 2025-01-31 | End: 2025-02-01

## 2025-01-31 RX ORDER — LABETALOL HYDROCHLORIDE 5 MG/ML
10 INJECTION, SOLUTION INTRAVENOUS EVERY 6 HOURS PRN
Status: DISCONTINUED | OUTPATIENT
Start: 2025-01-31 | End: 2025-02-01 | Stop reason: HOSPADM

## 2025-01-31 RX ADMIN — PIPERACILLIN AND TAZOBACTAM 4.5 G: 4; .5 INJECTION, POWDER, FOR SOLUTION INTRAVENOUS at 10:15

## 2025-01-31 RX ADMIN — PIPERACILLIN AND TAZOBACTAM 4.5 G: 4; .5 INJECTION, POWDER, FOR SOLUTION INTRAVENOUS at 21:25

## 2025-01-31 RX ADMIN — SODIUM CHLORIDE, POTASSIUM CHLORIDE, SODIUM LACTATE AND CALCIUM CHLORIDE: 600; 310; 30; 20 INJECTION, SOLUTION INTRAVENOUS at 08:52

## 2025-01-31 RX ADMIN — LISINOPRIL 5 MG: 5 TABLET ORAL at 15:37

## 2025-01-31 RX ADMIN — PIPERACILLIN AND TAZOBACTAM 4.5 G: 4; .5 INJECTION, POWDER, FOR SOLUTION INTRAVENOUS at 04:24

## 2025-01-31 RX ADMIN — SODIUM CHLORIDE, POTASSIUM CHLORIDE, SODIUM LACTATE AND CALCIUM CHLORIDE: 600; 310; 30; 20 INJECTION, SOLUTION INTRAVENOUS at 15:37

## 2025-01-31 RX ADMIN — PIPERACILLIN AND TAZOBACTAM 4.5 G: 4; .5 INJECTION, POWDER, FOR SOLUTION INTRAVENOUS at 16:07

## 2025-01-31 RX ADMIN — POTASSIUM CHLORIDE 40 MEQ: 1500 TABLET, EXTENDED RELEASE ORAL at 08:57

## 2025-01-31 ASSESSMENT — ACTIVITIES OF DAILY LIVING (ADL)
ADLS_ACUITY_SCORE: 29
ADLS_ACUITY_SCORE: 27
ADLS_ACUITY_SCORE: 27
ADLS_ACUITY_SCORE: 29
ADLS_ACUITY_SCORE: 29
ADLS_ACUITY_SCORE: 18
ADLS_ACUITY_SCORE: 18
ADLS_ACUITY_SCORE: 27
ADLS_ACUITY_SCORE: 29
ADLS_ACUITY_SCORE: 27
ADLS_ACUITY_SCORE: 29
ADLS_ACUITY_SCORE: 27
ADLS_ACUITY_SCORE: 27
ADLS_ACUITY_SCORE: 29
ADLS_ACUITY_SCORE: 18
ADLS_ACUITY_SCORE: 27

## 2025-01-31 NOTE — PROGRESS NOTES
COLON & RECTAL SURGERY  PROGRESS NOTE    January 31, 2025    SUBJECTIVE:  Feeling better. Pain improved. Used PO oxy x1. Tolerating clear liquids. AVSS. WBC 8.7 from 12.7.    OBJECTIVE:  Temp:  [97.9  F (36.6  C)-98.4  F (36.9  C)] 98.4  F (36.9  C)  Pulse:  [73-85] 73  Resp:  [16-18] 16  BP: (166-177)/(86-99) 174/86  SpO2:  [93 %-97 %] 93 %  No intake or output data in the 24 hours ending 01/31/25 0937    GENERAL:  Awake, alert, no acute distress, lying in bed  HEAD: Nomocephalic atraumatic  SCLERA: anicteric  EXTREMITIES: warm and well perfused  ABDOMEN:  Soft, non-distended, no rebound or guarding, no peritoneal signs    LABS:  Lab Results   Component Value Date    WBC 8.7 01/31/2025    WBC 7.9 06/17/2011     Lab Results   Component Value Date    HGB 12.2 01/31/2025    HGB 11.1 06/17/2011     Lab Results   Component Value Date    HCT 37.7 01/31/2025    HCT 34.0 06/17/2011     Lab Results   Component Value Date     01/31/2025     06/17/2011     Last Basic Metabolic Panel:  Lab Results   Component Value Date     01/31/2025     06/17/2011      Lab Results   Component Value Date    POTASSIUM 3.3 01/31/2025    POTASSIUM 4.0 06/17/2011     Lab Results   Component Value Date    CHLORIDE 101 01/31/2025    CHLORIDE 106 06/17/2011     Lab Results   Component Value Date    ZHANNA 8.5 01/31/2025    ZHANNA 8.6 06/17/2011     Lab Results   Component Value Date    CO2 28 01/31/2025    CO2 28 06/17/2011     Lab Results   Component Value Date    BUN 5.7 01/31/2025    BUN 15 06/17/2011     Lab Results   Component Value Date    CR 0.69 01/31/2025    CR 0.53 06/17/2011     Lab Results   Component Value Date    GLC 95 01/31/2025     06/17/2011       ASSESSMENT/PLAN: Alise is a 64 year old woman with a history of tobacco use and diverticulitis admitted with abdominal pain. CT showed acute sigmoid diverticulitis with a small intramural phlegmon, and a short segment distal transverse colonic thickening  concerning for infiltrative primary colonic mass.     - Full liquids  - Continue IV antibiotics  - Multimodal pain control  - Encourage ambulation  - No plans for surgery. Will need a colonoscopy in 6-8 weeks with follow up in our clinic to assess the probable colonic mass.   - Appreciate social works assistance    For questions/paging, please contact the CRS office at [phone].    Frieda Ac PA-C  [facility]  Phone: [phone]

## 2025-01-31 NOTE — CONSULTS
"Care Management Discharge Note    Discharge Date: 02/01/2025     Discharge Disposition:  Home    Additional Information:  CM consulted for \"Family /Support\", reviewed with provider. Pt is without insurance and will need surgery in the near future. CM messaged Financial Counseling to follow-up with pt regarding possible coverage options she may qualify for. Per chart review, pt lives home with her  and is independent at baseline. Per provider, no further needs anticipated at this time. Will sign off, please call if needs arise.     Cornelia Dunaway RN BSN   Inpatient Care Coordination  [facility]   Phone [phone]    "

## 2025-01-31 NOTE — PLAN OF CARE
Goal Outcome Evaluation:    [facility]    ED Boarding Nurse Handoff Addendum Report:    Date/time: 1/30/2025, 6:35 PM    Activity Level: standby    Fall Risk: Yes:  activity supervised    Active Infusions: LR @100ml/hr     Current Meds Due: N/A    Current care needs: Awaiting bed placement    Oxygen requirements (liters/min and/or FiO2): on RA    Respiratory status: Room air    Vital signs (within last 30 minutes):    Vitals:    01/30/25 0940 01/30/25 0945 01/30/25 1207 01/30/25 1320   BP: (!) 166/99  (!) 166/93 (!) 170/86   BP Location:       Pulse: 79  85 77   Resp:    18   Temp:       TempSrc:       SpO2:  93% 97% 96%   Weight:           Focused assessment within last 30 minutes:    Pt A&O. Pain managed with oxycodone. New  IV placed. IVF. AUO.     ED Boarding Nurse name: Shraddha Brandt RN

## 2025-01-31 NOTE — PLAN OF CARE
"Pt is Aox4 and calm. IV is infusing LR at 100 mL/hr and pt is tolerating clear liquid diet. Pt ambulates IND in the room. Pt denies pain and states that abdominal discomfort has been improving over how it was before - abdominal discomfort this morning stemming from hunger according to pt. Pt receiving IV Zosyn Q6hr. BP has been consistently elevated during the night, not high enough to page provider, providers notified at rounds. Pt has been sleeping peacefully during the night. SW and colorectal following. Continue to follow plan of care.     BP (!) 174/86 (BP Location: Right arm)   Pulse 73   Temp 98.4  F (36.9  C) (Oral)   Resp 16   Ht 1.549 m (5' 1\")   Wt 77.9 kg (171 lb 12.8 oz)   SpO2 93%   BMI 32.46 kg/m           Goal Outcome Evaluation:      Plan of Care Reviewed With: patient    Overall Patient Progress: no changeOverall Patient Progress: no change    Outcome Evaluation: pt states abdominal pain has been improving. pt is up IND in the room and has LR running at 100 mL/hr and is tolerating clear liquid diet.      Problem: Adult Inpatient Plan of Care  Goal: Plan of Care Review  Description: The Plan of Care Review/Shift note should be completed every shift.  The Outcome Evaluation is a brief statement about your assessment that the patient is improving, declining, or no change.  This information will be displayed automatically on your shift  note.  1/31/2025 1000 by Parth Rebolledo RN  Outcome: Progressing  Flowsheets (Taken 1/31/2025 1000)  Outcome Evaluation: pt states abdominal pain has been improving. pt is up IND in the room and has LR running at 100 mL/hr and is tolerating clear liquid diet.  Plan of Care Reviewed With: patient  Overall Patient Progress: no change  1/31/2025 0853 by Parth Rebolledo, RN  Outcome: Progressing  Flowsheets (Taken 1/31/2025 0853)  Plan of Care Reviewed With: patient  Overall Patient Progress: no change  1/31/2025 0526 by Parth Rebolledo, RN  Outcome: " "Progressing  Flowsheets (Taken 1/31/2025 0526)  Outcome Evaluation: pt states abdominal pain has been improving. pt is up SBA and has LR infusing at 100 mL/hr and is tolerating clear liquid diet  Plan of Care Reviewed With: patient  Overall Patient Progress: improving  Goal: Patient-Specific Goal (Individualized)  Description: You can add care plan individualizations to a care plan. Examples of Individualization might be:  \"Parent requests to be called daily at 9am for status\", \"I have a hard time hearing out of my right ear\", or \"Do not touch me to wake me up as it startles  me\".  1/31/2025 1000 by Parth Rebolledo RN  Outcome: Progressing  1/31/2025 0853 by Parth Rebolledo RN  Outcome: Progressing  1/31/2025 0526 by Parth Rebolledo RN  Outcome: Progressing  Goal: Absence of Hospital-Acquired Illness or Injury  1/31/2025 1000 by Parth Rebolledo RN  Outcome: Progressing  1/31/2025 0853 by Parth Rebolledo RN  Outcome: Progressing  1/31/2025 0526 by Parth Rebolledo RN  Outcome: Progressing  Intervention: Identify and Manage Fall Risk  Recent Flowsheet Documentation  Taken 1/31/2025 0838 by Parth Rebolledo RN  Safety Promotion/Fall Prevention:   assistive device/personal items within reach   clutter free environment maintained   nonskid shoes/slippers when out of bed   safety round/check completed  Taken 1/31/2025 0509 by Parth Rebolledo RN  Safety Promotion/Fall Prevention:   activity supervised   assistive device/personal items within reach   clutter free environment maintained   nonskid shoes/slippers when out of bed   safety round/check completed  Taken 1/30/2025 2133 by Parth Rebolledo RN  Safety Promotion/Fall Prevention:   activity supervised   assistive device/personal items within reach   clutter free environment maintained   nonskid shoes/slippers when out of bed   safety round/check completed  Intervention: Prevent Skin Injury  Recent Flowsheet Documentation  Taken 1/31/2025 0838 by Parth Rebolledo RN  Body Position: " position changed independently  Taken 1/31/2025 0509 by Parth Rebolledo RN  Body Position: position changed independently  Taken 1/30/2025 2133 by Parth Rebolledo RN  Body Position: position changed independently  Intervention: Prevent and Manage VTE (Venous Thromboembolism) Risk  Recent Flowsheet Documentation  Taken 1/31/2025 0838 by Parth Rebolledo RN  VTE Prevention/Management: SCDs off (sequential compression devices)  Taken 1/30/2025 2133 by Parth Rebolledo RN  VTE Prevention/Management: SCDs off (sequential compression devices)  Goal: Optimal Comfort and Wellbeing  1/31/2025 1000 by Parth Rebolledo RN  Outcome: Progressing  1/31/2025 0853 by Parth Rebolledo RN  Outcome: Progressing  1/31/2025 0526 by Parth Rebolledo RN  Outcome: Progressing  Goal: Readiness for Transition of Care  1/31/2025 1000 by Parth Rebolledo RN  Outcome: Progressing  1/31/2025 0853 by Parth Rebolledo RN  Outcome: Progressing  1/31/2025 0526 by Parth Rebolledo RN  Outcome: Progressing  Intervention: Mutually Develop Transition Plan  Recent Flowsheet Documentation  Taken 1/30/2025 2136 by Parth Rebolledo RN  Equipment Currently Used at Home: none     Problem: Pain Acute  Goal: Optimal Pain Control and Function  1/31/2025 1000 by Parth Rebolledo RN  Outcome: Progressing  1/31/2025 0853 by Parth Rebolledo RN  Outcome: Progressing  1/31/2025 0526 by Parth Rebolledo RN  Outcome: Progressing  Intervention: Prevent or Manage Pain  Recent Flowsheet Documentation  Taken 1/31/2025 0838 by Parth Rebolledo RN  Medication Review/Management: medications reviewed  Taken 1/30/2025 2133 by Parth Rebolledo RN  Medication Review/Management: medications reviewed

## 2025-01-31 NOTE — PLAN OF CARE
"Pt is Aox4 and calm. IV is infusing LR at 100 mL/hr and pt is tolerating clear liquid diet. Pt ambulates SBA in the room. Pt denies pain and states that abdominal discomfort has been improving over how it was before. Pt receiving IV Zosyn Q6hr. BP has been consistently elevated during the night, not high enough to page provider. Pt has been sleeping peacefully during the night. SW and colorectal following. Continue to follow plan of care.     BP (!) 167/90 (BP Location: Right arm)   Pulse 76   Temp 98.3  F (36.8  C) (Oral)   Resp 18   Ht 1.549 m (5' 1\")   Wt 77.9 kg (171 lb 12.8 oz)   SpO2 95%   BMI 32.46 kg/m           Goal Outcome Evaluation:      Plan of Care Reviewed With: patient    Overall Patient Progress: improvingOverall Patient Progress: improving    Outcome Evaluation: pt states abdominal pain has been improving. pt is up SBA and has LR infusing at 100 mL/hr and is tolerating clear liquid diet      Problem: Adult Inpatient Plan of Care  Goal: Plan of Care Review  Description: The Plan of Care Review/Shift note should be completed every shift.  The Outcome Evaluation is a brief statement about your assessment that the patient is improving, declining, or no change.  This information will be displayed automatically on your shift  note.  Outcome: Progressing  Flowsheets (Taken 1/31/2025 0526)  Outcome Evaluation: pt states abdominal pain has been improving. pt is up SBA and has LR infusing at 100 mL/hr and is tolerating clear liquid diet  Plan of Care Reviewed With: patient  Overall Patient Progress: improving  Goal: Patient-Specific Goal (Individualized)  Description: You can add care plan individualizations to a care plan. Examples of Individualization might be:  \"Parent requests to be called daily at 9am for status\", \"I have a hard time hearing out of my right ear\", or \"Do not touch me to wake me up as it startles  me\".  Outcome: Progressing  Goal: Absence of Hospital-Acquired Illness or " Injury  Outcome: Progressing  Intervention: Identify and Manage Fall Risk  Recent Flowsheet Documentation  Taken 1/31/2025 0509 by Parth Rebolledo RN  Safety Promotion/Fall Prevention:   activity supervised   assistive device/personal items within reach   clutter free environment maintained   nonskid shoes/slippers when out of bed   safety round/check completed  Taken 1/30/2025 2133 by Parth Rebolledo RN  Safety Promotion/Fall Prevention:   activity supervised   assistive device/personal items within reach   clutter free environment maintained   nonskid shoes/slippers when out of bed   safety round/check completed  Intervention: Prevent Skin Injury  Recent Flowsheet Documentation  Taken 1/31/2025 0509 by Parth Rebolledo RN  Body Position: position changed independently  Taken 1/30/2025 2133 by Parth Rebolledo RN  Body Position: position changed independently  Intervention: Prevent and Manage VTE (Venous Thromboembolism) Risk  Recent Flowsheet Documentation  Taken 1/30/2025 2133 by Parth Rebolledo RN  VTE Prevention/Management: SCDs off (sequential compression devices)  Goal: Optimal Comfort and Wellbeing  Outcome: Progressing  Goal: Readiness for Transition of Care  Outcome: Progressing  Intervention: Mutually Develop Transition Plan  Recent Flowsheet Documentation  Taken 1/30/2025 2136 by Parth Rebolledo RN  Equipment Currently Used at Home: none     Problem: Pain Acute  Goal: Optimal Pain Control and Function  Outcome: Progressing  Intervention: Prevent or Manage Pain  Recent Flowsheet Documentation  Taken 1/30/2025 2133 by Parth Rebolledo RN  Medication Review/Management: medications reviewed

## 2025-01-31 NOTE — PLAN OF CARE
"ROOM # 212-1    Living Situation (if not independent, order SW consult): house  Facility name:  :  [facility]    Activity level at baseline: IND  Activity level on admit: SBA    Who will be transporting you at discharge:  [facility]    Patient registered to observation; given Patient Bill of Rights; given the opportunity to ask questions about observation status and their plan of care.  Patient has been oriented to the observation room, bathroom and call light is in place.    Discussed discharge goals and expectations with patient/family.         Pt is Aox4 and calm. Iv is infusing Zosyn, LR to be infused at 100 mL/hr once antibiotic is done. Pt denies pain. Pt got up to bathroom SBA and reported some dizziness when ambulating, so bed alarm is on and pt is marked as SBA. Pt on clear liquid diet. BP was elevated after a trip to the bathroom, vitals will be rechecked with midnight vitals. Pt reports no BM yet. SW and colorectal following. Continue to follow plan of care.     BP (!) 177/97 (BP Location: Right arm)   Pulse 74   Temp 97.9  F (36.6  C) (Oral)   Resp 18   Ht 1.549 m (5' 1\")   Wt 77.9 kg (171 lb 12.8 oz)   SpO2 95%   BMI 32.46 kg/m     "

## 2025-01-31 NOTE — PROGRESS NOTES
[facility]    Medicine Progress Note - Hospitalist Service    Date of Admission:  1/30/2025    Assessment & Plan   Jacqueline A Goodell is a 64 year old female who does not doctor very often, reported an episode of diverticulitis almost a decade ago, current smoker who presented to the ER for the evaluation of subacute abdominal pain and was found to have acute sigmoid diverticulitis with a small intramural phlegmon.     Patient underwent CT abdomen and pelvis which revealed acute sigmoid diverticulitis with a small intramural phlegmon, short segment distal transverse colonic thickening concerning for infiltrative primary colonic mass, tiny hepatic cyst/hemangiomas in the right hepatic dome. The ER patient also complained of right upper quadrant abdominal pain.  Patient underwent ultrasound abdomen which was reassuring and without any evidence of cholecystitis.  Patient was started on IV Zosyn and admitted to the hospital for further management.     Acute sigmoid diverticulitis with a small intramural phlegmon  Concern for colonic mass  Reported 1 week history of diffuse abdominal pain.  A week ago she started eating honey of oats with after which she developed constipation. She took Senokot and her constipation resolved but she developed abdominal pain.  It comes and goes.  Not associated with fever, chills, nausea, or vomiting.  Reported an episode of diverticulitis several years ago.  Noted to have mild leukocytosis upon admission but otherwise hemodynamically stable.  Abdominal exam is overall reassuring except left abdominal tenderness.  CT findings revealed as mentioned above, no mechanical bowel obstruction, free gas, free fluid or abscess was noted.  - continue IV zosyn, likely transition to PO antibiotics tomorrow   - full liquid diet   - colorectal following    - needs colonoscopy in 6-8 weeks   - needs to establish care with PCP    Hypertension  Has been persistently hypertensive  "since admission.  With systolics in the 160s -170s diastolic 80 to 90s.  - will start low dose lisinopril 5 mg      Hepatic cysts/hemangiomas  This was noted on the CT.  Tiny hepatic cyst/hemangioma is present in the hepatic dome.  These require no specific follow-up.     Left lower lobe infiltrate  This was noted on the CT scan.  Patient denied any fever, chills, difficulty breathing.  She reported mucus in her throat which she is having difficulty clearing.    - continues to deny any respiratory symptoms   - above antibiotics would cover pneumonia      Current tobacco use  Patient reported smoking but has been weak and about the quantity of cigarettes that she smokes.  Her  says that she smokes for about 2 to 5 cigarettes/day.    -Denied nicotine patch  -Counseled on smoking cessation     Mild hyponatremia -resolved  Sodium 134 upon admission.      Hypokalemia -resolved   - RN replacement protocol            Diet: Full Liquid Diet    DVT Prophylaxis: Ambulate every shift  Stoner Catheter: Not present  Lines: None     Cardiac Monitoring: None  Code Status: No CPR- Do NOT Intubate      Clinically Significant Risk Factors        # Hypokalemia: Lowest K = 3.3 mmol/L in last 2 days, will replace as needed  # Hyponatremia: Lowest Na = 134 mmol/L in last 2 days, will monitor as appropriate  # Hypochloremia: Lowest Cl = 94 mmol/L in last 2 days, will monitor as appropriate  # Hypocalcemia: Lowest Ca = 8.5 mg/dL in last 2 days, will monitor and replace as appropriate                    # Obesity: Estimated body mass index is 32.46 kg/m  as calculated from the following:    Height as of this encounter: 1.549 m (5' 1\").    Weight as of this encounter: 77.9 kg (171 lb 12.8 oz)., PRESENT ON ADMISSION            Social Drivers of Health            Disposition Plan     Medically Ready for Discharge: Anticipated Tomorrow       The patient's care was discussed with the Bedside Nurse, Care Coordinator/, and " Patient.    CYDNEY Vera PA-C  Hospitalist Service  [facility]  Securely message with [employer] (more info)  Text page via [employer] Paging/Directory   ______________________________________________________________________    Interval History   Pain is improved.  Has only had coffee with creamer today.  Has not trialed any full liquids.  Is going to try some soup broth for this evening. no nausea or vomiting.  Is passing flatus and had a semiformed bowel movement today.  No fevers or chills, chest pain or shortness of breath.    Physical Exam   Vital Signs: Temp: 97.7  F (36.5  C) Temp src: Oral BP: (!) 166/104 Pulse: 62   Resp: 16 SpO2: 95 % O2 Device: None (Room air)    Weight: 171 lbs 12.8 oz    GENERAL:  Alert, Comfortable, No acute distress. Sitting on the edge of the bed  PSYCH: pleasant, oriented.  HEENT:  Normocephalic. No scleral icterus or conjunctival injection  HEART:  Normal S1, S2 with no murmur, RRR  LUNGS:  Normal Respiratory effort. Clear to auscultation bilaterally with no wheezing, rales or ronchi.  ABDOMEN:  Soft, mild lower abdominal tenderness, non distended. No peritoneal signs.   SKIN:  Warm, dry to touch. No rash.    Medical Decision Making       MANAGEMENT DISCUSSED with the following over the past 24 hours: patient, nursing   NOTE(S)/MEDICAL RECORDS REVIEWED over the past 24 hours: labs, imaging, progress notes       Data     I have personally reviewed the following data over the past 24 hrs:    8.7  \   12.2   / 344     140 101 5.7 (L) /  95   3.9 28 0.69 \       Imaging results reviewed over the past 24 hrs:   No results found for this or any previous visit (from the past 24 hours).

## 2025-01-31 NOTE — PLAN OF CARE
"Goal Outcome Evaluation:      Plan of Care Reviewed With: patient, spouse    Overall Patient Progress: no changeOverall Patient Progress: no change    Outcome Evaluation: A&O, frustrated with not being able to eat.  requesting to go out and smoke, offered nicotine replacement and patient declined.  Has only had coffee for intake and said made her abominal pain slightly worse, rates 3/10.  declined tylenol.  denies nausea.  on full liquid diet.  IVF infusing.  Independent in room.      Problem: Adult Inpatient Plan of Care  Goal: Plan of Care Review  Description: The Plan of Care Review/Shift note should be completed every shift.  The Outcome Evaluation is a brief statement about your assessment that the patient is improving, declining, or no change.  This information will be displayed automatically on your shift  note.  Outcome: Not Progressing  Flowsheets (Taken 1/31/2025 1539)  Outcome Evaluation: A&O, frustrated with not being able to eat.  requesting to go out and smoke, offered nicotine replacement and patient declined.  Has only had coffee for intake and said made her abominal pain slightly worse, rates 3/10.  declined tylenol.  denies nausea.  on full liquid diet.  IVF infusing.  Independent in room.  Plan of Care Reviewed With:   patient   spouse  Overall Patient Progress: no change  Goal: Patient-Specific Goal (Individualized)  Description: You can add care plan individualizations to a care plan. Examples of Individualization might be:  \"Parent requests to be called daily at 9am for status\", \"I have a hard time hearing out of my right ear\", or \"Do not touch me to wake me up as it startles  me\".  Outcome: Not Progressing  Goal: Absence of Hospital-Acquired Illness or Injury  Outcome: Progressing  Intervention: Prevent Infection  Recent Flowsheet Documentation  Taken 1/31/2025 1225 by Ruby Hernandez RN  Infection Prevention:   rest/sleep promoted   hand hygiene promoted  Goal: Optimal Comfort and " Wellbeing  Outcome: Progressing  Intervention: Monitor Pain and Promote Comfort  Recent Flowsheet Documentation  Taken 1/31/2025 1220 by Ruby Hernandez, RN  Pain Management Interventions: declines  Goal: Readiness for Transition of Care  Outcome: Progressing

## 2025-02-01 VITALS
SYSTOLIC BLOOD PRESSURE: 174 MMHG | HEART RATE: 70 BPM | BODY MASS INDEX: 32.44 KG/M2 | WEIGHT: 171.8 LBS | DIASTOLIC BLOOD PRESSURE: 83 MMHG | OXYGEN SATURATION: 95 % | TEMPERATURE: 98.3 F | HEIGHT: 61 IN | RESPIRATION RATE: 18 BRPM

## 2025-02-01 LAB
ANION GAP SERPL CALCULATED.3IONS-SCNC: 11 MMOL/L (ref 7–15)
BUN SERPL-MCNC: 4.6 MG/DL (ref 8–23)
CALCIUM SERPL-MCNC: 8.6 MG/DL (ref 8.8–10.4)
CHLORIDE SERPL-SCNC: 102 MMOL/L (ref 98–107)
CREAT SERPL-MCNC: 0.69 MG/DL (ref 0.51–0.95)
EGFRCR SERPLBLD CKD-EPI 2021: >90 ML/MIN/1.73M2
GLUCOSE SERPL-MCNC: 106 MG/DL (ref 70–99)
HCO3 SERPL-SCNC: 28 MMOL/L (ref 22–29)
MAGNESIUM SERPL-MCNC: 1.8 MG/DL (ref 1.7–2.3)
POTASSIUM SERPL-SCNC: 3.5 MMOL/L (ref 3.4–5.3)
SODIUM SERPL-SCNC: 141 MMOL/L (ref 135–145)

## 2025-02-01 PROCEDURE — 36415 COLL VENOUS BLD VENIPUNCTURE: CPT

## 2025-02-01 PROCEDURE — 250N000013 HC RX MED GY IP 250 OP 250 PS 637: Performed by: SURGERY

## 2025-02-01 PROCEDURE — 250N000013 HC RX MED GY IP 250 OP 250 PS 637

## 2025-02-01 PROCEDURE — 258N000003 HC RX IP 258 OP 636

## 2025-02-01 PROCEDURE — 99207 PR NO BILLABLE SERVICE THIS VISIT: CPT | Performed by: INTERNAL MEDICINE

## 2025-02-01 PROCEDURE — 83735 ASSAY OF MAGNESIUM: CPT

## 2025-02-01 PROCEDURE — 250N000011 HC RX IP 250 OP 636: Performed by: INTERNAL MEDICINE

## 2025-02-01 PROCEDURE — 250N000011 HC RX IP 250 OP 636: Performed by: EMERGENCY MEDICINE

## 2025-02-01 PROCEDURE — 82565 ASSAY OF CREATININE: CPT

## 2025-02-01 PROCEDURE — 99238 HOSP IP/OBS DSCHRG MGMT 30/<: CPT

## 2025-02-01 PROCEDURE — 80048 BASIC METABOLIC PNL TOTAL CA: CPT

## 2025-02-01 RX ORDER — ACETAMINOPHEN 325 MG/1
975 TABLET ORAL EVERY 8 HOURS PRN
COMMUNITY
Start: 2025-02-01 | End: 2025-02-01

## 2025-02-01 RX ORDER — LISINOPRIL 10 MG/1
10 TABLET ORAL DAILY
Status: DISCONTINUED | OUTPATIENT
Start: 2025-02-01 | End: 2025-02-01

## 2025-02-01 RX ORDER — ACETAMINOPHEN 325 MG/1
975 TABLET ORAL EVERY 8 HOURS PRN
COMMUNITY
Start: 2025-02-01

## 2025-02-01 RX ORDER — LISINOPRIL 5 MG/1
5 TABLET ORAL DAILY
Qty: 30 TABLET | Refills: 1 | Status: SHIPPED | OUTPATIENT
Start: 2025-02-02 | End: 2025-02-01

## 2025-02-01 RX ORDER — LISINOPRIL 5 MG/1
5 TABLET ORAL DAILY
Qty: 30 TABLET | Refills: 1 | Status: SHIPPED | OUTPATIENT
Start: 2025-02-02

## 2025-02-01 RX ORDER — LISINOPRIL 5 MG/1
5 TABLET ORAL DAILY
Status: DISCONTINUED | OUTPATIENT
Start: 2025-02-01 | End: 2025-02-01 | Stop reason: HOSPADM

## 2025-02-01 RX ADMIN — LABETALOL HYDROCHLORIDE 10 MG: 5 INJECTION, SOLUTION INTRAVENOUS at 00:11

## 2025-02-01 RX ADMIN — SODIUM CHLORIDE, POTASSIUM CHLORIDE, SODIUM LACTATE AND CALCIUM CHLORIDE: 600; 310; 30; 20 INJECTION, SOLUTION INTRAVENOUS at 00:17

## 2025-02-01 RX ADMIN — AMOXICILLIN AND CLAVULANATE POTASSIUM 1 TABLET: 875; 125 TABLET, FILM COATED ORAL at 10:46

## 2025-02-01 RX ADMIN — PIPERACILLIN AND TAZOBACTAM 4.5 G: 4; .5 INJECTION, POWDER, FOR SOLUTION INTRAVENOUS at 03:54

## 2025-02-01 RX ADMIN — LISINOPRIL 5 MG: 5 TABLET ORAL at 09:03

## 2025-02-01 ASSESSMENT — ACTIVITIES OF DAILY LIVING (ADL)
ADLS_ACUITY_SCORE: 29
ADLS_ACUITY_SCORE: 28

## 2025-02-01 NOTE — PROGRESS NOTES
Colon and Rectal Surgery  Daily Progress Note    Subjective  Patient reports feeling well.  She has been tolerating low fiber diet without abdominal pain.  Has been passing gas.  She is eager to discharge today.  She has many concerns about her ability to afford her hospital stay as she has no health insurance.    Objective  Intake/Output last 24 hrs:  Temp:  [97.7  F (36.5  C)-98.4  F (36.9  C)] 98.3  F (36.8  C)  Pulse:  [62-86] 69  Resp:  [16-18] 18  BP: (158-194)/() 168/92  SpO2:  [94 %-98 %] 94 %       Physical Exam:  General: awake, alert, in no acute distress  Respiratory: non-labored breathing  Abdomen: soft, non tender, non-distended    Pertinent Labs  Lab Results: personally reviewed.  Lab Results   Component Value Date     02/01/2025     01/31/2025     01/30/2025     06/17/2011    CO2 28 02/01/2025    CO2 28 01/31/2025    CO2 28 01/30/2025    CO2 28 06/17/2011    BUN 4.6 02/01/2025    BUN 5.7 01/31/2025    BUN 9.2 01/30/2025    BUN 15 06/17/2011     Lab Results   Component Value Date    WBC 8.7 01/31/2025    WBC 12.7 01/30/2025    WBC 7.9 06/17/2011    HGB 12.2 01/31/2025    HGB 13.6 01/30/2025    HGB 11.1 06/17/2011    HCT 37.7 01/31/2025    HCT 41.7 01/30/2025    HCT 34.0 06/17/2011    MCV 87 01/31/2025    MCV 86 01/30/2025    MCV 89 06/17/2011     01/31/2025     01/30/2025     06/17/2011       Assessment/Plan: This is a 64 year old female with diverticulitis and small intramural abscess and mass in transverse colon.     Clinically doing very well.  Patient's primary concern is her ability to afford this hospital stay and any additional health care given her lack of insurance.    Today  - Transitioned from IV pip tazo to amox-clav (10 d course)  - Ok to discharge from CRS perspective  - CRS will contact pt to schedule colonoscopy (will need colonoscopy in 6 to 8 weeks) and outpatient follow up  - Low fiber diet x2 weeks, then okay for regular diet (will  update discharge paper to reflect this)    Ongoing  -Low fiber diet  - multimodal pain control  - DVT ppx: enoxaparin  - ambulate, IS    Ashlee Pérez MD  Colon and Rectal Surgery Fellow  [facility]  [phone]..............................main

## 2025-02-01 NOTE — PROGRESS NOTES
Cross cover note:    Notified the patient's blood pressure was 194/103 and on recheck it was 189/100.  Ordered labetalol as needed for SBP > 180, DBP > 110, hold for HR <60.

## 2025-02-01 NOTE — PLAN OF CARE
Patient's After Visit Summary was reviewed with patient.  Patient verbalized understanding of After Visit Summary, recommended follow up and was given an opportunity to ask questions.   Discharge medications sent home with patient/family: to be picked up at her rx in [place]  Discharged with: family

## 2025-02-01 NOTE — DISCHARGE SUMMARY
"[facility]  Hospitalist Discharge Summary      Date of Admission:  1/30/2025  Date of Discharge:  2/1/2025  Discharging Provider: CYDNEY Vera PA-C  Discharge Service: Hospitalist Service    Discharge Diagnoses   Acute sigmoid diverticulitis with a small intramural phlegmon  Concern for colonic mass  Hypertension  Hepatic cysts/hemangiomas  Left lower lobe infiltrate  Mild hyponatremia  Hypokalemia    Clinically Significant Risk Factors     # Obesity: Estimated body mass index is 32.46 kg/m  as calculated from the following:    Height as of this encounter: 1.549 m (5' 1\").    Weight as of this encounter: 77.9 kg (171 lb 12.8 oz).       Follow-ups Needed After Discharge   Follow-up Appointments       Follow-up and recommended labs and tests       Follow up with primary care provider, Physician No Ref-Primary, within 7 days for hospital follow- up and blood pressure management. The following labs/tests are recommended: BMP.    Need to follow up with colorectal outpatient for colonoscopy in 6-8 weeks for colonoscopy. Likely need repeat imaging in upcoming weeks to reassess resolution of small diverticular abscess.              Discharge Disposition   Discharged to home  Condition at discharge: Stable    Hospital Course   Jacqueline A Goodell is a 64 year old female who does not doctor very often, reported an episode of diverticulitis almost a decade ago, current smoker who presented to the ER for the evaluation of subacute abdominal pain and was found to have acute sigmoid diverticulitis with a small intramural phlegmon.     Patient underwent CT abdomen and pelvis which revealed acute sigmoid diverticulitis with a small intramural phlegmon, short segment distal transverse colonic thickening concerning for infiltrative primary colonic mass, tiny hepatic cyst/hemangiomas in the right hepatic dome. The ER patient also complained of right upper quadrant abdominal pain.  Patient underwent ultrasound " abdomen which was reassuring and without any evidence of cholecystitis.  Patient was started on IV Zosyn and admitted to the hospital for further management.    At time of discharge patient was tolerating a regular diet.  Was cleared by colorectal for discharge.  Discharged on Augmentin for 2 weeks.  Will need close follow-up with colorectal for repeat imaging given the small abscess and follow-up for the concern for a possible colonic mass.  Will need outpatient colonoscopy.  Needs to establish care with a PCP.  Was started on lisinopril for likely undiagnosed hypertension.  Patient denied any abdominal pain, nausea or vomiting.  No chest pain, shortness of breath.  Educated patient on return precautions all questions were answered prior to discharge.  Patient felt comfortable with discharge plan.     Acute sigmoid diverticulitis with a small intramural phlegmon  Concern for colonic mass  Reported 1 week history of diffuse abdominal pain.  A week ago she started eating honey of oats with after which she developed constipation. She took Senokot and her constipation resolved but she developed abdominal pain.  It comes and goes.  Not associated with fever, chills, nausea, or vomiting.  Reported an episode of diverticulitis several years ago.  Noted to have mild leukocytosis upon admission but otherwise hemodynamically stable.  Abdominal exam is overall reassuring except left abdominal tenderness.  CT findings revealed as mentioned above, no mechanical bowel obstruction, free gas, free fluid or abscess was noted.  - continue IV zosyn, transition to Augmentin at discharge x 2 weeks  - tolerated regular diet   - colorectal following    - needs colonoscopy in 6-8 weeks   - needs to establish care with PCP    Hypertension  Has been persistently hypertensive since admission.  With systolics in the 160s -170s diastolic 80 to 90s.  - will start low dose lisinopril 5 mg      Hepatic cysts/hemangiomas  This was noted on the CT.   Tiny hepatic cyst/hemangioma is present in the hepatic dome.  These require no specific follow-up.     Left lower lobe infiltrate  This was noted on the CT scan.  Patient denied any fever, chills, difficulty breathing.  She reported mucus in her throat which she is having difficulty clearing.    - continues to deny any respiratory symptoms   - above antibiotics would cover pneumonia      Current tobacco use  Patient reported smoking but has been weak and about the quantity of cigarettes that she smokes.  Her  says that she smokes for about 2 to 5 cigarettes/day.    -Denied nicotine patch  -Counseled on smoking cessation     Mild hyponatremia -resolved  Sodium 134 upon admission.      Hypokalemia -resolved   - RN replacement protocol    Consultations This Hospital Stay   COLORECTAL SURGERY IP CONSULT  CARE MANAGEMENT / SOCIAL WORK IP CONSULT    Code Status   No CPR- Do NOT Intubate    Time Spent on this Encounter   I, CYDNEY Vera PA-C, personally saw the patient today and spent less than or equal to 30 minutes discharging this patient.     CYDNEY Vear PA-C  [facility] OBSERVATION DEPT  [address] [place] [address]  Phone: [phone]  ______________________________________________________________________    Physical Exam   Vital Signs: Temp: 98.3  F (36.8  C) Temp src: Oral BP: (!) 174/83 Pulse: 70   Resp: 18 SpO2: 95 % O2 Device: None (Room air)    Weight: 171 lbs 12.8 oz    GENERAL:  Alert, Comfortable, No acute distress. Sitting on the edge of the bed  PSYCH: pleasant, oriented.  HEENT:  Normocephalic. No scleral icterus or conjunctival injection  HEART:  Normal S1, S2 with no murmur, RRR  LUNGS:  Normal Respiratory effort. Clear to auscultation bilaterally with no wheezing, rales or ronchi.  ABDOMEN:  Soft, mild lower abdominal tenderness, non distended. No peritoneal signs.   SKIN:  Warm, dry to touch. No rash.       Primary Care Physician   Physician No  Ref-Primary    Discharge Orders      Adult Colorectal Surgery [facility] Referral      Reason for your hospital stay    You were admitted to the hospital for acute diverticulitis with small abscess, hypertension (high blood pressure).  You will need to continue antibiotics for your diverticulitis.  You will need to follow-up with colorectal outpatient.  You will need a colonoscopy in 6 to 8 weeks.  Please establish care with a primary care provider in 1 to 2 weeks.  You will need management for your high blood pressure.  I started you on blood pressure medication.  Please check your blood pressure once a day and keep a log on a piece of paper to bring to this follow-up visit in 1 to 2 weeks.     Follow-up and recommended labs and tests     Follow up with primary care provider, Physician No Ref-Primary, within 7 days for hospital follow- up and blood pressure management. The following labs/tests are recommended: BMP.    Need to follow up with colorectal outpatient for colonoscopy in 6-8 weeks for colonoscopy. Likely need repeat imaging in upcoming weeks to reassess resolution of small diverticular abscess.     Activity    Your activity upon discharge: activity as tolerated     When to contact your care team    Go to ED if you have any of the following: increasing abdominal pain, intractable N/V.     Monitor and record    Blood pressure: daily and bring record to next appointment.     Diet    Follow this diet upon discharge: Regular     Significant Results and Procedures   Results for orders placed or performed during the hospital encounter of 01/30/25   US Abdomen Limited    Narrative    EXAM: US ABDOMEN LIMITED  LOCATION: [facility]  DATE: 1/30/2025    INDICATION: RUQ pain  COMPARISON: None.  TECHNIQUE: Limited abdominal ultrasound.    FINDINGS:    GALLBLADDER: Mild cholelithiasis. No pericholecystic fluid, positive Moody's sign, or wall thickening.    BILE DUCTS: No biliary dilatation. The  common duct measures 5.2 mm.    LIVER: Normal parenchyma with smooth contour. No focal mass.    RIGHT KIDNEY: No hydronephrosis.    PANCREAS: The visualized portions are normal.    No ascites.      Impression    IMPRESSION:  1.  Mild cholelithiasis with no pericholecystic fluid, positive Moody's sign, or wall thickening.    2.  Normal caliber bile ducts.    3.  The liver, right kidney, and visualized portions the pancreas are normal.       CT Abdomen Pelvis w Contrast     Value    Radiologist flags (AA)     Acute sigmoid diverticulitis with an intramural small phlegmon, not amenable to percutaneous drainage. Thickening of the distal transverse colon, worrisome for a primary colonic mass. This should be evaluated further with endoscopy following resolution   of acute sigmoid diverticulitis.      Narrative    EXAM: CT ABDOMEN PELVIS W CONTRAST  LOCATION: [facility]  DATE: 1/30/2025    INDICATION: Right sided abdominal pain, leukocytosis, gallstones seen on ultrasound.  COMPARISON:   1. Ultrasound abdomen limited 1/30/2025.  2. CT abdomen and pelvis with IV contrast 6/17/2011.  TECHNIQUE: CT scan of the abdomen and pelvis was performed following injection of IV contrast. Multiplanar reformats were obtained. Dose reduction techniques were used.  CONTRAST: 86 mL Isovue 370.    FINDINGS:   LOWER CHEST: Slight left lower lobe infiltrate medially adjacent to the descending thoracic aorta. No pleural effusion on either side. Normal cardiac size. No pericardial effusion.    HEPATOBILIARY: Focal fat in the usual location in the left hepatic lobe anteroinferiorly. A few tiny cysts or hemangiomas in the right hepatic dome, no specific follow-up required. Resolved hypodense cyst in the right hepatic lobe seen previously.   Distended gallbladder without calcified stones, biliary dilatation or adjacent acute inflammatory changes. Patent hepatic and portal veins.    PANCREAS: Normal.    SPLEEN:  Normal.    ADRENAL GLANDS: Normal.    KIDNEYS/BLADDER: No urinary tract calculi. Both kidneys are well-perfused without hydronephrosis or hydroureter. Normal urinary bladder.    BOWEL: Acute sigmoid diverticulitis (images 137-154, series 3), with a small intramural phlegmon measuring 2.1 x 0.7 cm (image 61, series 4, images 74-76, series 5, images 146-148, series 3), not amenable to percutaneous drainage. There is a short   segment distal transverse colonic thickening (images 23-35, series 4, images , series 3), worrisome for an infiltrative primary colonic mass. Moderate amount of formed stool material within normal caliber colon. Normal appendix. Tiny hiatal hernia.    LYMPH NODES: A few small retroperitoneal nodes, a representative node measures 1.3 x 0.8 cm (image 88, series 3). No suspicious abdominopelvic adenopathy.    VASCULATURE: Atherosclerotic normal caliber distal abdominal aorta measuring 1.7 x 1.7 cm (image 89, series 3). Normal caliber IVC.    PELVIC ORGANS: Acute sigmoid diverticulitis with a small intramural phlegmon. Anteverted postmenopausal uterus. No adenopathy or free fluid.    MUSCULOSKELETAL: Degenerative changes involving the spine and joints of the pelvis. Small fat-containing ventral umbilical hernia.      Impression    IMPRESSION:   1.  Acute sigmoid diverticulitis with a small intramural phlegmon, not amenable to percutaneous drainage. No mechanical bowel obstruction, free gas, free fluid or abscess.    2.  Short segment distal transverse colonic thickening, worrisome for an infiltrative primary colonic mass. This should be evaluated further with endoscopy following resolution of acute sigmoid diverticulitis.    3.  Tiny hepatic cysts or hemangiomas in the right hepatic dome, these require no specific follow-up. Hypodense right hepatic lobe cyst seen previously has resolved. Distended gallbladder without stones, biliary dilatation or acute inflammatory changes.    4.  Slight left  lower lobe infiltrate medially adjacent to the descending thoracic aorta, possibly an infectious or an inflammatory process.    5.  Findings discussed with the referring physician, Dr. Cat, immediately following the study at 0350 hours.      [Critical Result: Acute sigmoid diverticulitis with an intramural small phlegmon, not amenable to percutaneous drainage. Thickening of the distal transverse colon, worrisome for a primary colonic mass. This should be evaluated further with endoscopy   following resolution of acute sigmoid diverticulitis.]    Finding was identified on 1/30/2025 3:30 AM CST.     Dr. Cat was contacted by me on 1/30/2025 3:46 AM CST and verbalized understanding of the critical result.      Discharge Medications   Current Discharge Medication List        START taking these medications    Details   acetaminophen (TYLENOL) 325 MG tablet Take 3 tablets (975 mg) by mouth every 8 hours as needed for mild pain or fever.    Associated Diagnoses: Diverticulitis of colon with perforation      amoxicillin-clavulanate (AUGMENTIN) 875-125 MG tablet Take 1 tablet by mouth every 12 hours for 13 days.  Qty: 26 tablet, Refills: 0    Associated Diagnoses: Diverticulitis of colon with perforation      lisinopril (ZESTRIL) 5 MG tablet Take 1 tablet (5 mg) by mouth daily.  Qty: 30 tablet, Refills: 1    Associated Diagnoses: Hypertension, unspecified type           Allergies   Allergies   Allergen Reactions    Codeine

## 2025-02-01 NOTE — PLAN OF CARE
"[id number]    Inpatient Progress Note:    BP (!) 158/93 (BP Location: Right arm)   Pulse 65   Temp 98.4  F (36.9  C) (Oral)   Resp 18   Ht 1.549 m (5' 1\")   Wt 77.9 kg (171 lb 12.8 oz)   SpO2 96%   BMI 32.46 kg/m         Orientation: A&Ox4, pt can be anxious at times wanting to smoke  Pain status: Pt complains of 2/10 pain in abd.  Activity: Ind  Resp: WDL, denies SOB  Cardiac: WDL, denies chest pain, BP high during night- Provider paged- labetalol given.- monitor BP  GI: X, diverticulitis w colonic mass- colorectal following  :  WDL  Skin: WLD  LDA: L forearm  Infusions: LR 75mL/hr  Pertinent Labs: K and Mag protocol-am checks  Diet: Full liquids-tolerating very well  Consults: Colorectal following-needs surgery in near future/follow up w colonoscopy in 6-8 weeks,- full liquids now  Discharge Plan: Home    Will continue to monitor and provide cares.     Tyra Smith RN       Problem: Adult Inpatient Plan of Care  Goal: Plan of Care Review  Description: The Plan of Care Review/Shift note should be completed every shift.  The Outcome Evaluation is a brief statement about your assessment that the patient is improving, declining, or no change.  This information will be displayed automatically on your shift  note.  Outcome: Progressing  Flowsheets (Taken 1/31/2025 2025)  Outcome Evaluation: A&Ox4, ind in room. Abd CT- diverticulitis w colonic mass. Colorectal following- needs surgery in near future/ follow up- pt currently on full liquid diet.. IV zosyn. LR 75mL/hr. K and Ma protocol  Plan of Care Reviewed With: patient  Overall Patient Progress: improving  Goal: Patient-Specific Goal (Individualized)  Description: You can add care plan individualizations to a care plan. Examples of Individualization might be:  \"Parent requests to be called daily at 9am for status\", \"I have a hard time hearing out of my right ear\", or \"Do not touch me to wake me up as it startles  me\".  Outcome: Progressing  Goal: Absence of " Hospital-Acquired Illness or Injury  Outcome: Progressing  Intervention: Identify and Manage Fall Risk  Recent Flowsheet Documentation  Taken 1/31/2025 1624 by Tyra Smith RN  Safety Promotion/Fall Prevention:   assistive device/personal items within reach   clutter free environment maintained   nonskid shoes/slippers when out of bed   safety round/check completed  Intervention: Prevent Skin Injury  Recent Flowsheet Documentation  Taken 1/31/2025 1624 by Tyra Smith RN  Body Position: position changed independently  Intervention: Prevent and Manage VTE (Venous Thromboembolism) Risk  Recent Flowsheet Documentation  Taken 1/31/2025 1624 by Tyra Smith RN  VTE Prevention/Management: SCDs off (sequential compression devices)  Intervention: Prevent Infection  Recent Flowsheet Documentation  Taken 1/31/2025 1624 by Tyra Smith RN  Infection Prevention:   rest/sleep promoted   hand hygiene promoted  Goal: Optimal Comfort and Wellbeing  Outcome: Progressing  Goal: Readiness for Transition of Care  Outcome: Progressing     Problem: Pain Acute  Goal: Optimal Pain Control and Function  Outcome: Progressing  Intervention: Prevent or Manage Pain  Recent Flowsheet Documentation  Taken 1/31/2025 1624 by Tyra Smith RN  Medication Review/Management: medications reviewed   Goal Outcome Evaluation:      Plan of Care Reviewed With: patient    Overall Patient Progress: improvingOverall Patient Progress: improving    Outcome Evaluation: A&Ox4, ind in room. Abd CT- diverticulitis w colonic mass. Colorectal following- needs surgery in near future/ follow up- pt currently on full liquid diet.. IV zosyn. LR 75mL/hr. K and Ma protocol

## 2025-02-04 ENCOUNTER — PATIENT OUTREACH (OUTPATIENT)
Dept: SURGERY | Facility: CLINIC | Age: 65
End: 2025-02-04

## 2025-02-04 NOTE — PROGRESS NOTES
Diverticulitis/colon wall thickening referral:    Recently admitted for acute diverticulitis with perforation. Concern for colonic mass.     IMPRESSION:   1.  Acute sigmoid diverticulitis with a small intramural phlegmon, not amenable to percutaneous drainage. No mechanical bowel obstruction, free gas, free fluid or abscess.  2.  Short segment distal transverse colonic thickening, worrisome for an infiltrative primary colonic mass. This should be evaluated further with endoscopy following resolution of acute sigmoid diverticulitis.  3.  Tiny hepatic cysts or hemangiomas in the right hepatic dome, these require no specific follow-up. Hypodense right hepatic lobe cyst seen previously has resolved. Distended gallbladder without stones, biliary dilatation or acute inflammatory changes.  4.  Slight left lower lobe infiltrate medially adjacent to the descending thoracic aorta, possibly an infectious or an inflammatory process.  5.  Findings discussed with the referring physician, Dr. Cat, immediately following the study at 0350 hours.    Treated with antibiotics. She currently does not have insurance. Was suppose to have a call with the financial team yesterday but she reports not receiving a call. Rescheduled appt. I told her I would call after this.

## 2025-02-06 NOTE — PROGRESS NOTES
Per FC team pt does not qualify for [place] or [facility]. Ok to schedule appt. Left voicemail x2 with direct contact number.

## 2025-02-11 ENCOUNTER — APPOINTMENT (OUTPATIENT)
Dept: CT IMAGING | Facility: CLINIC | Age: 65
DRG: 392 | End: 2025-02-11
Attending: EMERGENCY MEDICINE

## 2025-02-11 ENCOUNTER — HOSPITAL ENCOUNTER (INPATIENT)
Facility: CLINIC | Age: 65
LOS: 2 days | Discharge: HOME | End: 2025-02-13
Attending: EMERGENCY MEDICINE | Admitting: INTERNAL MEDICINE

## 2025-02-11 DIAGNOSIS — I10 ESSENTIAL HYPERTENSION: Primary | ICD-10-CM

## 2025-02-11 DIAGNOSIS — K57.20 DIVERTICULITIS OF COLON WITH PERFORATION: ICD-10-CM

## 2025-02-11 DIAGNOSIS — K57.32 SIGMOID DIVERTICULITIS: ICD-10-CM

## 2025-02-11 LAB
ALBUMIN SERPL BCG-MCNC: 3.6 G/DL (ref 3.5–5.2)
ALP SERPL-CCNC: 122 U/L (ref 40–150)
ALT SERPL W P-5'-P-CCNC: 9 U/L (ref 0–50)
ANION GAP SERPL CALCULATED.3IONS-SCNC: 12 MMOL/L (ref 7–15)
ANION GAP SERPL CALCULATED.3IONS-SCNC: 12 MMOL/L (ref 7–15)
AST SERPL W P-5'-P-CCNC: 17 U/L (ref 0–45)
BASOPHILS # BLD AUTO: 0.1 10E3/UL (ref 0–0.2)
BASOPHILS NFR BLD AUTO: 0 %
BILIRUB SERPL-MCNC: 0.3 MG/DL
BUN SERPL-MCNC: 8.4 MG/DL (ref 8–23)
BUN SERPL-MCNC: 9.9 MG/DL (ref 8–23)
CALCIUM SERPL-MCNC: 8.6 MG/DL (ref 8.8–10.4)
CALCIUM SERPL-MCNC: 9.1 MG/DL (ref 8.8–10.4)
CHLORIDE SERPL-SCNC: 100 MMOL/L (ref 98–107)
CHLORIDE SERPL-SCNC: 97 MMOL/L (ref 98–107)
CREAT SERPL-MCNC: 0.73 MG/DL (ref 0.51–0.95)
CREAT SERPL-MCNC: 0.74 MG/DL (ref 0.51–0.95)
EGFRCR SERPLBLD CKD-EPI 2021: 90 ML/MIN/1.73M2
EGFRCR SERPLBLD CKD-EPI 2021: >90 ML/MIN/1.73M2
EOSINOPHIL # BLD AUTO: 2 10E3/UL (ref 0–0.7)
EOSINOPHIL NFR BLD AUTO: 15 %
ERYTHROCYTE [DISTWIDTH] IN BLOOD BY AUTOMATED COUNT: 13.5 % (ref 10–15)
ERYTHROCYTE [DISTWIDTH] IN BLOOD BY AUTOMATED COUNT: 13.6 % (ref 10–15)
GLUCOSE SERPL-MCNC: 100 MG/DL (ref 70–99)
GLUCOSE SERPL-MCNC: 108 MG/DL (ref 70–99)
HCO3 SERPL-SCNC: 26 MMOL/L (ref 22–29)
HCO3 SERPL-SCNC: 28 MMOL/L (ref 22–29)
HCT VFR BLD AUTO: 38.2 % (ref 35–47)
HCT VFR BLD AUTO: 41.6 % (ref 35–47)
HGB BLD-MCNC: 12.6 G/DL (ref 11.7–15.7)
HGB BLD-MCNC: 13.8 G/DL (ref 11.7–15.7)
HOLD SPECIMEN: NORMAL
HOLD SPECIMEN: NORMAL
IMM GRANULOCYTES # BLD: 0.1 10E3/UL
IMM GRANULOCYTES NFR BLD: 1 %
LIPASE SERPL-CCNC: 11 U/L (ref 13–60)
LYMPHOCYTES # BLD AUTO: 2.3 10E3/UL (ref 0.8–5.3)
LYMPHOCYTES NFR BLD AUTO: 17 %
MAGNESIUM SERPL-MCNC: 2 MG/DL (ref 1.7–2.3)
MCH RBC QN AUTO: 28.3 PG (ref 26.5–33)
MCH RBC QN AUTO: 28.4 PG (ref 26.5–33)
MCHC RBC AUTO-ENTMCNC: 33 G/DL (ref 31.5–36.5)
MCHC RBC AUTO-ENTMCNC: 33.2 G/DL (ref 31.5–36.5)
MCV RBC AUTO: 85 FL (ref 78–100)
MCV RBC AUTO: 86 FL (ref 78–100)
MONOCYTES # BLD AUTO: 1.3 10E3/UL (ref 0–1.3)
MONOCYTES NFR BLD AUTO: 9 %
NEUTROPHILS # BLD AUTO: 8 10E3/UL (ref 1.6–8.3)
NEUTROPHILS NFR BLD AUTO: 58 %
NRBC # BLD AUTO: 0 10E3/UL
NRBC BLD AUTO-RTO: 0 /100
PLATELET # BLD AUTO: 445 10E3/UL (ref 150–450)
PLATELET # BLD AUTO: 490 10E3/UL (ref 150–450)
POTASSIUM SERPL-SCNC: 3.5 MMOL/L (ref 3.4–5.3)
POTASSIUM SERPL-SCNC: 3.6 MMOL/L (ref 3.4–5.3)
PROT SERPL-MCNC: 7.2 G/DL (ref 6.4–8.3)
RBC # BLD AUTO: 4.44 10E6/UL (ref 3.8–5.2)
RBC # BLD AUTO: 4.87 10E6/UL (ref 3.8–5.2)
SODIUM SERPL-SCNC: 137 MMOL/L (ref 135–145)
SODIUM SERPL-SCNC: 138 MMOL/L (ref 135–145)
WBC # BLD AUTO: 13.6 10E3/UL (ref 4–11)
WBC # BLD AUTO: 13.8 10E3/UL (ref 4–11)

## 2025-02-11 PROCEDURE — 82565 ASSAY OF CREATININE: CPT | Performed by: INTERNAL MEDICINE

## 2025-02-11 PROCEDURE — 36415 COLL VENOUS BLD VENIPUNCTURE: CPT | Performed by: EMERGENCY MEDICINE

## 2025-02-11 PROCEDURE — 250N000011 HC RX IP 250 OP 636: Performed by: EMERGENCY MEDICINE

## 2025-02-11 PROCEDURE — 99285 EMERGENCY DEPT VISIT HI MDM: CPT | Mod: 25

## 2025-02-11 PROCEDURE — 83735 ASSAY OF MAGNESIUM: CPT | Performed by: INTERNAL MEDICINE

## 2025-02-11 PROCEDURE — 36415 COLL VENOUS BLD VENIPUNCTURE: CPT | Performed by: INTERNAL MEDICINE

## 2025-02-11 PROCEDURE — 250N000009 HC RX 250: Performed by: EMERGENCY MEDICINE

## 2025-02-11 PROCEDURE — 99207 PR APP CREDIT; MD BILLING SHARED VISIT: CPT | Mod: FS

## 2025-02-11 PROCEDURE — 85041 AUTOMATED RBC COUNT: CPT | Performed by: INTERNAL MEDICINE

## 2025-02-11 PROCEDURE — 258N000003 HC RX IP 258 OP 636: Performed by: INTERNAL MEDICINE

## 2025-02-11 PROCEDURE — 96375 TX/PRO/DX INJ NEW DRUG ADDON: CPT

## 2025-02-11 PROCEDURE — 120N000004 HC R&B MS OVERFLOW

## 2025-02-11 PROCEDURE — 250N000013 HC RX MED GY IP 250 OP 250 PS 637: Performed by: INTERNAL MEDICINE

## 2025-02-11 PROCEDURE — 99223 1ST HOSP IP/OBS HIGH 75: CPT | Performed by: INTERNAL MEDICINE

## 2025-02-11 PROCEDURE — 99254 IP/OBS CNSLTJ NEW/EST MOD 60: CPT | Mod: FS | Performed by: COLON & RECTAL SURGERY

## 2025-02-11 PROCEDURE — 74177 CT ABD & PELVIS W/CONTRAST: CPT

## 2025-02-11 PROCEDURE — 85025 COMPLETE CBC W/AUTO DIFF WBC: CPT | Performed by: EMERGENCY MEDICINE

## 2025-02-11 PROCEDURE — 96374 THER/PROPH/DIAG INJ IV PUSH: CPT | Mod: 59

## 2025-02-11 PROCEDURE — 83690 ASSAY OF LIPASE: CPT | Performed by: EMERGENCY MEDICINE

## 2025-02-11 PROCEDURE — 99207 PR NO BILLABLE SERVICE THIS VISIT: CPT | Performed by: INTERNAL MEDICINE

## 2025-02-11 PROCEDURE — 250N000011 HC RX IP 250 OP 636: Mod: JZ | Performed by: INTERNAL MEDICINE

## 2025-02-11 PROCEDURE — 80053 COMPREHEN METABOLIC PANEL: CPT | Performed by: EMERGENCY MEDICINE

## 2025-02-11 RX ORDER — HYDROMORPHONE HYDROCHLORIDE 1 MG/ML
0.5 INJECTION, SOLUTION INTRAMUSCULAR; INTRAVENOUS; SUBCUTANEOUS ONCE
Status: COMPLETED | OUTPATIENT
Start: 2025-02-11 | End: 2025-02-11

## 2025-02-11 RX ORDER — NALOXONE HYDROCHLORIDE 0.4 MG/ML
0.4 INJECTION, SOLUTION INTRAMUSCULAR; INTRAVENOUS; SUBCUTANEOUS
Status: DISCONTINUED | OUTPATIENT
Start: 2025-02-11 | End: 2025-02-13 | Stop reason: HOSPADM

## 2025-02-11 RX ORDER — PIPERACILLIN SODIUM, TAZOBACTAM SODIUM 3; .375 G/15ML; G/15ML
3.38 INJECTION, POWDER, LYOPHILIZED, FOR SOLUTION INTRAVENOUS EVERY 6 HOURS
Status: DISCONTINUED | OUTPATIENT
Start: 2025-02-11 | End: 2025-02-13 | Stop reason: HOSPADM

## 2025-02-11 RX ORDER — LISINOPRIL 5 MG/1
5 TABLET ORAL DAILY
Status: DISCONTINUED | OUTPATIENT
Start: 2025-02-11 | End: 2025-02-11

## 2025-02-11 RX ORDER — NALOXONE HYDROCHLORIDE 0.4 MG/ML
0.2 INJECTION, SOLUTION INTRAMUSCULAR; INTRAVENOUS; SUBCUTANEOUS
Status: DISCONTINUED | OUTPATIENT
Start: 2025-02-11 | End: 2025-02-13 | Stop reason: HOSPADM

## 2025-02-11 RX ORDER — AMOXICILLIN 250 MG
1 CAPSULE ORAL 2 TIMES DAILY PRN
Status: DISCONTINUED | OUTPATIENT
Start: 2025-02-11 | End: 2025-02-13 | Stop reason: HOSPADM

## 2025-02-11 RX ORDER — ACETAMINOPHEN 325 MG/1
975 TABLET ORAL EVERY 8 HOURS PRN
Status: DISCONTINUED | OUTPATIENT
Start: 2025-02-11 | End: 2025-02-13 | Stop reason: HOSPADM

## 2025-02-11 RX ORDER — PIPERACILLIN SODIUM, TAZOBACTAM SODIUM 3; .375 G/15ML; G/15ML
3.38 INJECTION, POWDER, LYOPHILIZED, FOR SOLUTION INTRAVENOUS ONCE
Status: COMPLETED | OUTPATIENT
Start: 2025-02-11 | End: 2025-02-11

## 2025-02-11 RX ORDER — ONDANSETRON 4 MG/1
4 TABLET, ORALLY DISINTEGRATING ORAL EVERY 6 HOURS PRN
Status: DISCONTINUED | OUTPATIENT
Start: 2025-02-11 | End: 2025-02-13 | Stop reason: HOSPADM

## 2025-02-11 RX ORDER — CALCIUM CARBONATE 500 MG/1
1000 TABLET, CHEWABLE ORAL 4 TIMES DAILY PRN
Status: DISCONTINUED | OUTPATIENT
Start: 2025-02-11 | End: 2025-02-13 | Stop reason: HOSPADM

## 2025-02-11 RX ORDER — AMOXICILLIN 250 MG
2 CAPSULE ORAL 2 TIMES DAILY PRN
Status: DISCONTINUED | OUTPATIENT
Start: 2025-02-11 | End: 2025-02-13 | Stop reason: HOSPADM

## 2025-02-11 RX ORDER — HYDROMORPHONE HCL IN WATER/PF 6 MG/30 ML
0.2 PATIENT CONTROLLED ANALGESIA SYRINGE INTRAVENOUS
Status: DISCONTINUED | OUTPATIENT
Start: 2025-02-11 | End: 2025-02-13 | Stop reason: HOSPADM

## 2025-02-11 RX ORDER — AMLODIPINE BESYLATE 5 MG/1
5 TABLET ORAL DAILY
Status: DISCONTINUED | OUTPATIENT
Start: 2025-02-11 | End: 2025-02-12

## 2025-02-11 RX ORDER — SODIUM CHLORIDE 9 MG/ML
INJECTION, SOLUTION INTRAVENOUS CONTINUOUS
Status: ACTIVE | OUTPATIENT
Start: 2025-02-11 | End: 2025-02-11

## 2025-02-11 RX ORDER — LISINOPRIL 20 MG/1
20 TABLET ORAL DAILY
Status: DISCONTINUED | OUTPATIENT
Start: 2025-02-11 | End: 2025-02-13 | Stop reason: HOSPADM

## 2025-02-11 RX ORDER — IOPAMIDOL 755 MG/ML
500 INJECTION, SOLUTION INTRAVASCULAR ONCE
Status: COMPLETED | OUTPATIENT
Start: 2025-02-11 | End: 2025-02-11

## 2025-02-11 RX ORDER — LIDOCAINE 40 MG/G
CREAM TOPICAL
Status: DISCONTINUED | OUTPATIENT
Start: 2025-02-11 | End: 2025-02-13 | Stop reason: HOSPADM

## 2025-02-11 RX ORDER — ONDANSETRON 2 MG/ML
4 INJECTION INTRAMUSCULAR; INTRAVENOUS ONCE
Status: COMPLETED | OUTPATIENT
Start: 2025-02-11 | End: 2025-02-11

## 2025-02-11 RX ORDER — HYDROMORPHONE HCL IN WATER/PF 6 MG/30 ML
0.4 PATIENT CONTROLLED ANALGESIA SYRINGE INTRAVENOUS
Status: DISCONTINUED | OUTPATIENT
Start: 2025-02-11 | End: 2025-02-13 | Stop reason: HOSPADM

## 2025-02-11 RX ORDER — OXYCODONE HYDROCHLORIDE 5 MG/1
5 TABLET ORAL EVERY 4 HOURS PRN
Status: DISCONTINUED | OUTPATIENT
Start: 2025-02-11 | End: 2025-02-11

## 2025-02-11 RX ADMIN — PIPERACILLIN AND TAZOBACTAM 3.38 G: 3; .375 INJECTION, POWDER, FOR SOLUTION INTRAVENOUS at 14:44

## 2025-02-11 RX ADMIN — PIPERACILLIN AND TAZOBACTAM 3.38 G: 3; .375 INJECTION, POWDER, FOR SOLUTION INTRAVENOUS at 22:09

## 2025-02-11 RX ADMIN — ONDANSETRON 4 MG: 2 INJECTION, SOLUTION INTRAMUSCULAR; INTRAVENOUS at 01:27

## 2025-02-11 RX ADMIN — LISINOPRIL 20 MG: 20 TABLET ORAL at 08:45

## 2025-02-11 RX ADMIN — HYDROMORPHONE HYDROCHLORIDE 0.2 MG: 0.2 INJECTION, SOLUTION INTRAMUSCULAR; INTRAVENOUS; SUBCUTANEOUS at 03:38

## 2025-02-11 RX ADMIN — PIPERACILLIN AND TAZOBACTAM 3.38 G: 3; .375 INJECTION, POWDER, FOR SOLUTION INTRAVENOUS at 08:45

## 2025-02-11 RX ADMIN — SODIUM CHLORIDE 61 ML: 9 INJECTION, SOLUTION INTRAVENOUS at 01:36

## 2025-02-11 RX ADMIN — AMLODIPINE BESYLATE 5 MG: 5 TABLET ORAL at 08:44

## 2025-02-11 RX ADMIN — ACETAMINOPHEN 975 MG: 325 TABLET, FILM COATED ORAL at 06:06

## 2025-02-11 RX ADMIN — OXYCODONE HYDROCHLORIDE 2.5 MG: 5 TABLET ORAL at 18:29

## 2025-02-11 RX ADMIN — OXYCODONE HYDROCHLORIDE 2.5 MG: 5 TABLET ORAL at 12:57

## 2025-02-11 RX ADMIN — PIPERACILLIN AND TAZOBACTAM 3.38 G: 3; .375 INJECTION, POWDER, FOR SOLUTION INTRAVENOUS at 03:01

## 2025-02-11 RX ADMIN — HYDROMORPHONE HYDROCHLORIDE 0.5 MG: 1 INJECTION, SOLUTION INTRAMUSCULAR; INTRAVENOUS; SUBCUTANEOUS at 01:29

## 2025-02-11 RX ADMIN — IOPAMIDOL 83 ML: 755 INJECTION, SOLUTION INTRAVENOUS at 01:36

## 2025-02-11 RX ADMIN — SODIUM CHLORIDE: 9 INJECTION, SOLUTION INTRAVENOUS at 03:36

## 2025-02-11 ASSESSMENT — ACTIVITIES OF DAILY LIVING (ADL)
ADLS_ACUITY_SCORE: 27
ADLS_ACUITY_SCORE: 27
ADLS_ACUITY_SCORE: 52
ADLS_ACUITY_SCORE: 53
ADLS_ACUITY_SCORE: 52
ADLS_ACUITY_SCORE: 53
ADLS_ACUITY_SCORE: 52
ADLS_ACUITY_SCORE: 52
ADLS_ACUITY_SCORE: 27
ADLS_ACUITY_SCORE: 52
ADLS_ACUITY_SCORE: 27
ADLS_ACUITY_SCORE: 52
ADLS_ACUITY_SCORE: 27
ADLS_ACUITY_SCORE: 52
ADLS_ACUITY_SCORE: 27
ADLS_ACUITY_SCORE: 52
ADLS_ACUITY_SCORE: 27
ADLS_ACUITY_SCORE: 27
ADLS_ACUITY_SCORE: 53
ADLS_ACUITY_SCORE: 53
ADLS_ACUITY_SCORE: 27

## 2025-02-11 ASSESSMENT — COLUMBIA-SUICIDE SEVERITY RATING SCALE - C-SSRS
2. HAVE YOU ACTUALLY HAD ANY THOUGHTS OF KILLING YOURSELF IN THE PAST MONTH?: NO
6. HAVE YOU EVER DONE ANYTHING, STARTED TO DO ANYTHING, OR PREPARED TO DO ANYTHING TO END YOUR LIFE?: NO
1. IN THE PAST MONTH, HAVE YOU WISHED YOU WERE DEAD OR WISHED YOU COULD GO TO SLEEP AND NOT WAKE UP?: NO

## 2025-02-11 NOTE — ED PROVIDER NOTES
"  Emergency Department Note      History of Present Illness     Chief Complaint   Abdominal Pain      HPI   Jacqueline A Goodell is a 64 year old female with a history of recently diagnosed diverticulitis, presenting today with worsening abdominal pain.  Patient states that she has been pain since she left the hospital, but her symptoms have been progressively worsening.  She had 1 episode of vomiting 3 days ago.  She has had persistent nonbloody diarrhea.  She denies fevers, but has been having some night sweats.  She tried to follow-up with the specialist as instructed, but was told that because it was after the 30th of the month, she could no longer open and role and insurance.  The specialist's office told her that they would not see her until she obtained insurance.  The patient states that she has been \"scammed 4 times\" by people posing his insurance representatives, and is not sure what she should do.  She states that she has been taking the Augmentin for the most part, but missed a dose yesterday.  Otherwise, she denies any painful urination.  Abdominal pain seems to be worse after eating or drinking.      Independent Historian   None    Review of External Notes   IV discharge summary from 2/1/2025.  Patient was admitted with acute sigmoid diverticulitis and a small intramural phlegmon.  There is also concern for colonic mass.  He was initially treated with IV Zosyn and then transition to Augmentin which she was to take for 2 weeks.  She was seen in the hospital by colorectal surgery.    Past Medical History     Medical History and Problem List   Past Medical History:   Diagnosis Date    Asthma     Cholelithiasis     Depressive disorder     Diverticular disease of colon        Medications   acetaminophen (TYLENOL) 325 MG tablet  amoxicillin-clavulanate (AUGMENTIN) 875-125 MG tablet  lisinopril (ZESTRIL) 5 MG tablet        Surgical History   Past Surgical History:   Procedure Laterality Date    MAMMOPLASTY " AUGMENTATION      Tubal Ligation NOS Bilateral        Physical Exam     Patient Vitals for the past 24 hrs:   BP Temp Temp src Pulse Resp SpO2 Weight   02/11/25 0014 (!) 179/107 97.7  F (36.5  C) Oral 102 20 97 % 75.7 kg (166 lb 14.2 oz)     Physical Exam  General: alert, appears uncomfortable  HENT: mucous membranes moist  CV: regular rate, regular rhythm  Resp: normal effort, clear throughout, no crackles or wheezing  GI: abdomen soft but tender mostly in the right lower and right upper quadrant.  Positive Moody sign.  MSK: no bony tenderness  Skin: appropriately warm and dry  Extremities: no edema, calves non-tender  Neuro: alert, clear speech, oriented  Psych: normal mood and affect      Diagnostics     Lab Results   Labs Ordered and Resulted from Time of ED Arrival to Time of ED Departure   COMPREHENSIVE METABOLIC PANEL - Abnormal       Result Value    Sodium 137      Potassium 3.6      Carbon Dioxide (CO2) 28      Anion Gap 12      Urea Nitrogen 9.9      Creatinine 0.73      GFR Estimate >90      Calcium 9.1      Chloride 97 (*)     Glucose 108 (*)     Alkaline Phosphatase 122      AST 17      ALT 9      Protein Total 7.2      Albumin 3.6      Bilirubin Total 0.3     LIPASE - Abnormal    Lipase 11 (*)    CBC WITH PLATELETS AND DIFFERENTIAL - Abnormal    WBC Count 13.8 (*)     RBC Count 4.87      Hemoglobin 13.8      Hematocrit 41.6      MCV 85      MCH 28.3      MCHC 33.2      RDW 13.5      Platelet Count 490 (*)     % Neutrophils 58      % Lymphocytes 17      % Monocytes 9      % Eosinophils 15      % Basophils 0      % Immature Granulocytes 1      NRBCs per 100 WBC 0      Absolute Neutrophils 8.0      Absolute Lymphocytes 2.3      Absolute Monocytes 1.3      Absolute Eosinophils 2.0 (*)     Absolute Basophils 0.1      Absolute Immature Granulocytes 0.1      Absolute NRBCs 0.0         Imaging   CT Abdomen Pelvis w Contrast   Final Result   IMPRESSION:    1.  Sigmoid diverticulitis. Inflammation is overall  increased from the prior exam. There are 2 small mural abscesses measuring up to 1.5 cm.          EKG   none    Independent Interpretation   None    ED Course      Medications Administered   Medications   HYDROmorphone (PF) (DILAUDID) injection 0.5 mg (has no administration in time range)   ondansetron (ZOFRAN) injection 4 mg (has no administration in time range)       Procedures   Procedures     Discussion of Management   Admitting hospitalist, Dr. Astudillo.    ED Course        Additional Documentation  None    Medical Decision Making / Diagnosis     CMS Diagnoses: None    MIPS       None    [facility]   Jacqueline A Goodell is a 64 year old female with recent history of diverticulitis, presenting with recurrent symptoms.  On exam, the patient is afebrile but hypertensive.  She does have abdominal tenderness no peritonitis.  Labs demonstrate mild leukocytosis, slightly worsened from discharge.  CT imaging demonstrates recurrence of 2 phlegmon's, as well as worsened inflammatory changes around the sigmoid colon consistent with recurrent diverticulitis.  At this point, no indication for immediate surgical intervention.  Patient will will be admitted for continued IV antibiotics, colorectal surgery evaluation, and pain control.    Disposition   The patient was admitted to the hospital.     Diagnosis     ICD-10-CM    1. Sigmoid diverticulitis  K57.32     with phlegmon             MD Carmen Gomez Tracy Lynn, MD  02/11/25 0334

## 2025-02-11 NOTE — PROGRESS NOTES
Quick update    Reviewed H&P by Dr Astudillo and agree with assessment and plan    Patient having a headache now and blood pressure up over 180/100.      Discussed with nursing    Plan:  NPO for CRS to see  Continue on antibiotics  Is NPO on IV fluids  Start on norvasc/increase lisinopril for better blood pressure control  Patient was noncompliant with follow up as she does not have insurance putting her at risk for readmissions, moribidity, and early mortality.   Discussed CODE and agrees to FULL, had been DNR last admission.      THIS is a NONBILLABLE NOTE as PATIENT WAS SEEN AFTER MIDNIGHT BY MY PARTNER     Israel Frausto MD

## 2025-02-11 NOTE — H&P
"[facility]    Hospitalist History and Physical    Name: Jacqueline A Goodell    MRN: [id number]  YOB: 1960    Age: 64 year old  Date of Admission:  2/11/2025  Date of Service (when I saw the patient): 02/11/25    Assessment & Plan   Jacqueline A Goodell is a 64 year old female with past medical significant for asthma, ?  Hypertension, depression, diverticulosis, recent hospitalization for sigmoid diverticulitis with small intramural phlegmon discharged 10 days ago from hospital returns to the emergency room with abdominal l pain, nausea, and vomiting.  Repeat imaging shows worsening diverticulitis.      Recurrent /persistent sigmoid diverticulitis with 2 intramural abscesses  -Discharge 2/1/2025 for sigmoid diverticulitis on oral Augmentin.  Per patient reports she may have missed a few doses of antibiotics, unable to follow-up with colorectal due to lack of insurance.  Presents to the emergency room with recurrence of symptoms  -Repeat abdominal CT today 2/11/2025 shows \"Sigmoid diverticulitis. Inflammation is overall increased from the prior exam. There are 2 small mural abscesses measuring up to 1.5 cm.\"  - Patient was started on IV Zosyn in the ER, will continue  - Clear liquid diet,  - Colorectal consult patient unable to see colorectal as outpatient        Concern for infiltrative primary colonic mass  On CT scan patient was noted to have short segment distal transverse colonic thickening which is worrisome for an infiltrative primary colonic mass.  Patient does not doctor often.  She never had a colonoscopy before.  She denied any weight loss.  She is a smoker.  - Patient needs outpatient colonoscopy once she gets better from diverticulitis standpoint  - Failed to follow-up with colorectal or PCP as outpatient.  --Colorectal consult    New diagnosis of hypertension  Uncontrolled elevated blood pressure : New diagnosis of hypertension  -Not on any meds as " "outpatient  -Possibly secondary to pain  -Resume prior to admission lisinopril        Hepatic cysts/hemangiomas  This was noted on the CT.  Tiny hepatic cyst/hemangioma is present in the hepatic dome.  These require no specific follow-up.     Current tobacco use  -Counseled on smoking cessation.    Asthma  -Resume prior to admission meds once we can    Depression  -Resume prior to admission meds once reconciled    Failed to follow-up  Insurance issues  -Social work consult     Clinically Significant Risk Factors Present on Admission          # Hypochloremia: Lowest Cl = 97 mmol/L in last 2 days, will monitor as appropriate          # Hypertension: Home medication list includes antihypertensive(s)           # Obesity: Estimated body mass index is 31.53 kg/m  as calculated from the following:    Height as of 1/30/25: 1.549 m (5' 1\").    Weight as of this encounter: 75.7 kg (166 lb 14.2 oz).              DVT Prophylaxis: Pneumatic Compression Devices  Code Status: Full Code    Disposition:  Medically Ready for Discharge: Anticipated in 2-4 Days      Primary Care Physician   Physician No Ref-Primary    Chief Complaint   Abdominal pain and vomiting    History is obtained from the patient    History of Present Illness   Jacqueline A Goodell is a 64 year old female who presents with past medical significant for asthma, ?  Hypertension, depression, diverticulosis, recent hospitalization for sigmoid diverticulitis with small intramural phlegmon discharged 10 days ago from hospital returns to the emergency room with abdominal l pain, nausea, and vomiting.      Patient notes that she was well on discharge was improving for 3 days but then developed vomiting she stopped her meds for 24 hours.  Subsequently developed worsening abdominal pain, nausea and vomiting    Pain is described as similar to her prior episode but much more severe 10 out of 10 in intensity worse with eating cramp-like intermittent getting more frequent and " more intense so came to the emergency room.  Feels she was hot but did not take her temperature.  She did not follow-up with colorectal had some concerns with insurance and coverage was.  Complains of generalized malaise and weakness.  More than 10 review of system was carried out was otherwise negative.      In the emergency room repeat imaging shows worsening diverticulitis.  Patient was started on Zosyn is being admitted for further evaluation and treatment.    Past Medical History    Past Medical History:   Diagnosis Date    Asthma     Cholelithiasis     Depressive disorder     Diverticular disease of colon          Past Surgical History   Past Surgical History:   Procedure Laterality Date    MAMMOPLASTY AUGMENTATION      Tubal Ligation NOS Bilateral        Prior to Admission Medications   Prior to Admission Medications   Prescriptions Last Dose Informant Patient Reported? Taking?   acetaminophen (TYLENOL) 325 MG tablet   No No   Sig: Take 3 tablets (975 mg) by mouth every 8 hours as needed for mild pain or fever.   amoxicillin-clavulanate (AUGMENTIN) 875-125 MG tablet   No No   Sig: Take 1 tablet by mouth every 12 hours.   lisinopril (ZESTRIL) 5 MG tablet   No No   Sig: Take 1 tablet (5 mg) by mouth daily.      Facility-Administered Medications: None     Allergies   Allergies   Allergen Reactions    Codeine     Hydrocodone Nausea and Vomiting       Social History   Social History     Tobacco Use    Smoking status: Not on file    Smokeless tobacco: Not on file   Substance Use Topics    Alcohol use: Not on file     Social History     Social History Narrative    Not on file     Continues to smoke however in the last 24 hours was trying to smoke but could not.  No alcohol  Family History   I have reviewed this patient's family history and updated it with pertinent information if needed.     Family history significant for hypertension    No family history on file.    Review of Systems   A Comprehensive greater than  "10 system review of systems was carried out.  Pertinent positives and negatives are noted above.  Otherwise negative for contributory information.    Physical Exam   Temp: 97.7  F (36.5  C) Temp src: Oral BP: (!) 176/96 Pulse: 91   Resp: 20 SpO2: 96 %      Vital Signs with Ranges  Temp:  [97.7  F (36.5  C)] 97.7  F (36.5  C)  Pulse:  [] 91  Resp:  [20] 20  BP: (176-179)/() 176/96  SpO2:  [96 %-97 %] 96 %  166 lbs 14.21 oz    GEN:  Alert, oriented x 3, appears comfortable, no overt distress  HEENT:  Normocephalic/atraumatic, no scleral icterus, no nasal discharge, mouth dry  CV:  Regular rate and rhythm, no murmur or JVD.  S1 + S2 noted, no S3 or S4.  LUNGS: Respiratory effort otherwise clear to auscultation bilaterally without rales/rhonchi/wheezing/retractions.  Symmetric chest rise on inhalation noted.  ABD:  Active bowel sounds, soft, lower abdominal tenderness no distention.  No rebound/guarding/rigidity.  EXT:  No edema.  No cyanosis.  No joint synovitis noted.  SKIN:  Dry to touch, no exanthems noted in the visualized areas.  NEURO:  Symmetric muscle strength,  No new focal deficits appreciated.    Data   Data reviewed today:  I personally reviewed the abdominal CT image(s) showing diverticulitis with abscesses .    Recent Labs   Lab 02/11/25 0121   WBC 13.8*   HGB 13.8   HCT 41.6   MCV 85   *     Recent Labs   Lab 02/11/25 0121      POTASSIUM 3.6   CHLORIDE 97*   CO2 28   ANIONGAP 12   *   BUN 9.9   CR 0.73   GFRESTIMATED >90   ZHANNA 9.1     7-Day Micro Results       No results found for the last 168 hours.          Recent Labs   Lab 02/11/25 0121   HGB 13.8     Recent Labs   Lab 02/11/25 0121   AST 17   ALT 9   ALKPHOS 122   BILITOTAL 0.3     No results for input(s): \"INR\" in the last 168 hours.  No results for input(s): \"LACT\" in the last 168 hours.  Recent Labs   Lab 02/11/25  0121   LIPASE 11*       Recent Results (from the past 24 hours)   CT Abdomen Pelvis w Contrast    " Narrative    EXAM: CT ABDOMEN PELVIS W CONTRAST  LOCATION: [facility]  DATE: 2/11/2025    INDICATION: RLQ abdominal pain  COMPARISON: 1/30/2025.  TECHNIQUE: CT scan of the abdomen and pelvis was performed following injection of IV contrast. Multiplanar reformats were obtained. Dose reduction techniques were used.  CONTRAST: 83mL Isovue 370    FINDINGS:   LOWER CHEST: Normal.    HEPATOBILIARY: Small probable hepatic cysts.    PANCREAS: Normal.    SPLEEN: Normal.    ADRENAL GLANDS: Normal.    KIDNEYS/BLADDER: There is no hydronephrosis. Small amount of air in the urinary bladder.    BOWEL: There are colonic diverticula. There is again a segment of acute diverticulitis in the distal sigmoid colon. There is no perforation. Inflammatory change is increased since previous exam. There are 2 small probable mural abscesses measuring up to   1.5 cm. No bowel obstruction. No free intraperitoneal gas or fluid.    LYMPH NODES: Normal.    VASCULATURE: Atherosclerotic calcification of the aorta and its branches. No aneurysm.    PELVIC ORGANS: Normal.    MUSCULOSKELETAL: Degenerative disease throughout the spine. Small paraumbilical hernia containing fat.      Impression    IMPRESSION:   1.  Sigmoid diverticulitis. Inflammation is overall increased from the prior exam. There are 2 small mural abscesses measuring up to 1.5 cm.

## 2025-02-11 NOTE — ED NOTES
[facility]  ED Nurse Handoff Report    ED Chief complaint: Abdominal Pain  . ED Diagnosis:   Final diagnoses:   Sigmoid diverticulitis - with phlegmon       Allergies:   Allergies   Allergen Reactions    Codeine     Hydrocodone Nausea and Vomiting       Code Status: Full Code    Activity level - Baseline/Home:  independent.  Activity Level - Current:   independent.   Lift room needed: No.   Bariatric: No   Needed: No   Isolation: No.   Infection: Not Applicable.     Respiratory status: Room air    Vital Signs (within 30 minutes):   Vitals:    02/11/25 0014 02/11/25 0101   BP: (!) 179/107 (!) 176/96   Pulse: 102 91   Resp: 20    Temp: 97.7  F (36.5  C)    TempSrc: Oral    SpO2: 97% 96%   Weight: 75.7 kg (166 lb 14.2 oz)        Cardiac Rhythm:  ,      Pain level:    Patient confused: No.   Patient Falls Risk: patient and family education.   Elimination Status:  due to void.       Patient Report - Initial Complaint: Pt presents with abdominal pain and diarrhea over the last week from diverticulitis, gall stones and a new mass in her colon. Pain increases significantly after eating or drinking. Pt tried to follow up with GI after recent admission, but has no insurance and specialist would not see her. Hypertensive, all other VSS .   Focused Assessment:    HPI   Jacqueline A Goodell is a 64 year old female with a history of recently diagnosed diverticulitis, presenting today with worsening abdominal pain.  Patient states that she has been pain since she left the hospital, but her symptoms have been progressively worsening.  She had 1 episode of vomiting 3 days ago.  She has had persistent nonbloody diarrhea.  She denies fevers, but has been having some night sweats.  She tried to follow-up with the specialist as instructed, but was told that because it was after the 30th of the month, she could no longer open and role and insurance.  The specialist's office told her that they would not  "see her until she obtained insurance.  The patient states that she has been \"scammed 4 times\" by people posing his insurance representatives, and is not sure what she should do.  She states that she has been taking the Augmentin for the most part, but missed a dose yesterday.  Otherwise, she denies any painful urination.  Abdominal pain seems to be worse after eating or drinking.     Abnormal Results:   Labs Ordered and Resulted from Time of ED Arrival to Time of ED Departure   COMPREHENSIVE METABOLIC PANEL - Abnormal       Result Value    Sodium 137      Potassium 3.6      Carbon Dioxide (CO2) 28      Anion Gap 12      Urea Nitrogen 9.9      Creatinine 0.73      GFR Estimate >90      Calcium 9.1      Chloride 97 (*)     Glucose 108 (*)     Alkaline Phosphatase 122      AST 17      ALT 9      Protein Total 7.2      Albumin 3.6      Bilirubin Total 0.3     LIPASE - Abnormal    Lipase 11 (*)    CBC WITH PLATELETS AND DIFFERENTIAL - Abnormal    WBC Count 13.8 (*)     RBC Count 4.87      Hemoglobin 13.8      Hematocrit 41.6      MCV 85      MCH 28.3      MCHC 33.2      RDW 13.5      Platelet Count 490 (*)     % Neutrophils 58      % Lymphocytes 17      % Monocytes 9      % Eosinophils 15      % Basophils 0      % Immature Granulocytes 1      NRBCs per 100 WBC 0      Absolute Neutrophils 8.0      Absolute Lymphocytes 2.3      Absolute Monocytes 1.3      Absolute Eosinophils 2.0 (*)     Absolute Basophils 0.1      Absolute Immature Granulocytes 0.1      Absolute NRBCs 0.0          CT Abdomen Pelvis w Contrast   Final Result   IMPRESSION:    1.  Sigmoid diverticulitis. Inflammation is overall increased from the prior exam. There are 2 small mural abscesses measuring up to 1.5 cm.          Treatments provided: iv, labs, fluids, pain meds, imaging.   Family Comments:  at bedside.   OBS brochure/video discussed/provided to patient:  N/A  ED Medications:   Medications   piperacillin-tazobactam (ZOSYN) 3.375 g vial to " attach to  mL bag (3.375 g Intravenous $New Bag 2/11/25 0301)   HYDROmorphone (PF) (DILAUDID) injection 0.5 mg (0.5 mg Intravenous $Given 2/11/25 0129)   ondansetron (ZOFRAN) injection 4 mg (4 mg Intravenous $Given 2/11/25 0127)   iopamidol (ISOVUE-370) solution 500 mL (83 mLs Intravenous $Given 2/11/25 0136)   CT scan flush (61 mLs Intravenous $Given 2/11/25 0136)       Drips infusing:  No  For the majority of the shift this patient was Green.   Interventions performed were none.    Sepsis treatment initiated: No    Cares/treatment/interventions/medications to be completed following ED care: per provider orders.     ED Nurse Name: Taylro Burgos RN  3:04 AM    RECEIVING UNIT ED HANDOFF REVIEW    Above ED Nurse Handoff Report was reviewed: Yes  Reviewed by: Nanette Jones RN on February 11, 2025 at 6:37 AM   I Valeria called the ED to inform them the note was read: Yes

## 2025-02-11 NOTE — ED TRIAGE NOTES
Pt presents with abdominal pain and diarrhea over the last week from diverticulitis, gall stones and a new mass in her colon. Pain increases significantly after eating or drinking. Pt tried to follow up with GI after recent admission, but has no insurance and specialist would not see her. Hypertensive, all other VSS     Triage Assessment (Adult)       Row Name 02/11/25 0016          Triage Assessment    Airway WDL WDL        Respiratory WDL    Respiratory WDL WDL        Skin Circulation/Temperature WDL    Skin Circulation/Temperature WDL WDL        Cardiac WDL    Cardiac WDL WDL        Peripheral/Neurovascular WDL    Peripheral Neurovascular WDL WDL        Cognitive/Neuro/Behavioral WDL    Cognitive/Neuro/Behavioral WDL WDL

## 2025-02-11 NOTE — PHARMACY-ADMISSION MEDICATION HISTORY
Pharmacist Admission Medication History    Admission medication history is complete. The information provided in this note is only as accurate as the sources available at the time of the update.    Information Source(s): Patient via phone    Pertinent Information: pt said lisinopril gave her a headache so she hasn't been taking it    Changes made to PTA medication list:  Added: None  Deleted: None  Changed: None    Allergies reviewed with patient and updates made in EHR: yes    Medication History Completed By: Vin Gregory RPH 2/11/2025 11:20 AM    PTA Med List   Medication Sig Last Dose/Taking    acetaminophen (TYLENOL) 325 MG tablet Take 3 tablets (975 mg) by mouth every 8 hours as needed for mild pain or fever. Taking As Needed    amoxicillin-clavulanate (AUGMENTIN) 875-125 MG tablet Take 1 tablet by mouth every 12 hours. 2/10/2025 Evening

## 2025-02-11 NOTE — PROGRESS NOTES
[facility]    ED Boarding Nurse Handoff Addendum Report:    Date/time: 2/11/2025, 5:06 AM    Activity Level: standby    Fall Risk: No    Active Infusions: NS@100ml/hr    Current Meds Due: N/A    Current care needs: Pain management    Oxygen requirements (liters/min and/or FiO2): N/A    Respiratory status: Room air    Vital signs (within last 30 minutes):    Vitals:    02/11/25 0239 02/11/25 0244 02/11/25 0259 02/11/25 0341   BP: (!) 184/105 (!) 166/102 (!) 181/100 (!) 165/93   Pulse: 97 93 90 96   Resp:    18   Temp:       TempSrc:       SpO2:  94% 94% 93%   Weight:           Focused assessment within last 30 minutes:    PRN IV dilaudid given for abdominal pain 4/10.     ED Boarding Nurse name: Lacy Awad RN

## 2025-02-11 NOTE — PLAN OF CARE
"Goal Outcome Evaluation:      Plan of Care Reviewed With: patient    Overall Patient Progress: improvingOverall Patient Progress: improving    Orientation: Alert and oriented x4. Pt resting most of shift today.   VSS. 92% on RA.   LS: Clear and equal bilaterally.   GI: Pt is passing gas. No BM this shift. Denies N/V. Some abdominal discomfort but no tenderness with palpation. + BS. Clear liquid diet.   : Voiding as needed.   Skin: WDL.   Activity: Independent. Pt slept comfortably throughout shift between cares.   Pain: 0-4/10. Pain well managed with Oxycodone x1.   Updates/Plan: Continue conservative management. Continue IV abx. Manage pain/nausea as needed. Continue with current cares.     Problem: Adult Inpatient Plan of Care  Goal: Plan of Care Review  Description: The Plan of Care Review/Shift note should be completed every shift.  The Outcome Evaluation is a brief statement about your assessment that the patient is improving, declining, or no change.  This information will be displayed automatically on your shift  note.  Outcome: Progressing  Flowsheets (Taken 2/11/2025 1203)  Plan of Care Reviewed With: patient  Overall Patient Progress: improving  Goal: Patient-Specific Goal (Individualized)  Description: You can add care plan individualizations to a care plan. Examples of Individualization might be:  \"Parent requests to be called daily at 9am for status\", \"I have a hard time hearing out of my right ear\", or \"Do not touch me to wake me up as it startles  me\".  Outcome: Progressing  Goal: Absence of Hospital-Acquired Illness or Injury  Outcome: Progressing  Intervention: Identify and Manage Fall Risk  Recent Flowsheet Documentation  Taken 2/11/2025 0653 by Ute Galindo, RN  Safety Promotion/Fall Prevention:   activity supervised   assistive device/personal items within reach   clutter free environment maintained   lighting adjusted   nonskid shoes/slippers when out of bed   room near nurse's station   " room organization consistent   safety round/check completed   supervised activity   treat underlying cause  Intervention: Prevent Skin Injury  Recent Flowsheet Documentation  Taken 2/11/2025 0842 by Ute Galindo RN  Body Position: position changed independently  Skin Protection: adhesive use limited  Intervention: Prevent Infection  Recent Flowsheet Documentation  Taken 2/11/2025 0842 by Ute Galindo RN  Infection Prevention:   cohorting utilized   environmental surveillance performed   equipment surfaces disinfected   hand hygiene promoted   personal protective equipment utilized   rest/sleep promoted  Goal: Optimal Comfort and Wellbeing  Outcome: Progressing  Goal: Readiness for Transition of Care  Outcome: Progressing     Problem: Skin Injury Risk Increased  Goal: Skin Health and Integrity  Outcome: Progressing  Intervention: Optimize Skin Protection  Recent Flowsheet Documentation  Taken 2/11/2025 0842 by Ute Galindo RN  Skin Protection: adhesive use limited  Activity Management:   activity adjusted per tolerance   ambulated in room   back to bed  Head of Bed (HOB) Positioning: HOB at 20-30 degrees

## 2025-02-11 NOTE — CONSULTS
[facility]  Colon and Rectal Surgery Consult Note  Name: Jacqueline A Goodell    MRN: [id number]  YOB: 1960    Age: 64 year old  Date of admission: 2/11/2025  Primary care provider: No Ref-Primary, Physician     Requesting Physician:  Dr. sAtudillo  Reason for consult:  Worsening diverticulitis with abscess           History of Present Illness:   Jacqueline A Goodell is a 64 year old female with a history of diverticulitis and tobacco use, seen at the request of Dr. Astudillo, presents with abdominal pain and nausea. She was recently admitted to [facility] from 1/30-2/1 for acute complicated sigmoid diverticulitis with an intramural abscess. CT scan at that time also noted thickening of the distal transverse colon worrisome for a infiltrative colonic mass. She did well and was discharged home with 2 weeks of Augmentin with plans for a colonoscopy in 6-8 weeks. The patient reports she took the antibiotics as prescribed but missed 2 doses as she was not feeling well and vomited. She continued on a full liquid diet but did not feel any better so she came back to the ER. In the ER, she was afebrile but hypertensive to 179/107 and tachycardic to 102. Her labs were remarkable for lipase 11, WBC 13.8, and hgb 13.8. A repeat CT scan of the abdomen and pelvis revealed continued sigmoid diverticulitis with increased inflammation and 2 small intramural abscesses measuring up to 1.5 cm. The potential transverse mass was not mentioned. She was started on IV antibiotics and admitted for further management.    Today, the patient reports she is feeling better currently after getting pain medication last night in the ER. No current nausea or vomiting. She had a loose stool this morning without blood. She continues to pass gas.     Of note, she did have a follow up scheduled for 2/18 with Dr. Gutierrez. However, the patient was unaware of this appointment and does not have insurance so does not think she can follow  up.    Colonoscopy History:  No prior colonoscopy    Surgical History: No prior abdominal surgery            Past Medical History:     Past Medical History:   Diagnosis Date    Asthma     Cholelithiasis     Depressive disorder     Diverticular disease of colon              Past Surgical History:     Past Surgical History:   Procedure Laterality Date    MAMMOPLASTY AUGMENTATION      Tubal Ligation NOS Bilateral                Social History:     Social History     Tobacco Use    Smoking status: Not on file    Smokeless tobacco: Not on file   Substance Use Topics    Alcohol use: Not on file             Family History:   No family history on file.          Allergies:     Allergies   Allergen Reactions    Codeine     Hydrocodone Nausea and Vomiting             Medications:     Current Facility-Administered Medications   Medication Dose Route Frequency Provider Last Rate Last Admin    amLODIPine (NORVASC) tablet 5 mg  5 mg Oral Daily Israel Frausto MD        [Held by provider] amoxicillin-clavulanate (AUGMENTIN) 875-125 MG per tablet 1 tablet  1 tablet Oral Q12H Tosha Astudillo MD        lisinopril (ZESTRIL) tablet 20 mg  20 mg Oral Daily Israel Frausto MD        piperacillin-tazobactam (ZOSYN) 3.375 g vial to attach to  mL bag  3.375 g Intravenous Q6H Tosha Astudillo MD        sodium chloride (PF) 0.9% PF flush 3 mL  3 mL Intracatheter Q8H Tosha Astudillo MD                 Review of Systems:   A comprehensive greater than 10 system review of systems was carried out.  Pertinent positives and negatives are noted above.  Otherwise negative for contributory info.            Physical Exam:     Blood pressure 138/76, pulse 76, temperature 98.3  F (36.8  C), temperature source Oral, resp. rate 18, weight 74.2 kg (163 lb 8 oz), SpO2 92%.    Intake/Output Summary (Last 24 hours) at 2/11/2025 0844  Last data filed at 2/11/2025 0604  Gross per 24 hour   Intake 246.67 ml   Output --   Net 246.67  ml     EXAM:  GEN: Awake alert and oriented  PULM: Non-labored breathing with normal respiratory effort  CVS: reg rate and rhythm, no peripheral edema  ABD: Soft, minimally tender, round. No rebound or guarding   RECTAL: Rectal exam was deferred  NEURO: CN II-XII grossly intact  MSK: extremeties with no clubbing, cyanosis or edema; able to ambulate  PSYCH: responsive, alert, cooperative; oriented x3; appropriate mood and affect  EXT/SKIN: inspection reveals no rashes, lesions or ulcers, normal coloring         Data Reviewed:     Results for orders placed or performed during the hospital encounter of 02/11/25   CT Abdomen Pelvis w Contrast    Narrative    EXAM: CT ABDOMEN PELVIS W CONTRAST  LOCATION: [facility]  DATE: 2/11/2025    INDICATION: RLQ abdominal pain  COMPARISON: 1/30/2025.  TECHNIQUE: CT scan of the abdomen and pelvis was performed following injection of IV contrast. Multiplanar reformats were obtained. Dose reduction techniques were used.  CONTRAST: 83mL Isovue 370    FINDINGS:   LOWER CHEST: Normal.    HEPATOBILIARY: Small probable hepatic cysts.    PANCREAS: Normal.    SPLEEN: Normal.    ADRENAL GLANDS: Normal.    KIDNEYS/BLADDER: There is no hydronephrosis. Small amount of air in the urinary bladder.    BOWEL: There are colonic diverticula. There is again a segment of acute diverticulitis in the distal sigmoid colon. There is no perforation. Inflammatory change is increased since previous exam. There are 2 small probable mural abscesses measuring up to   1.5 cm. No bowel obstruction. No free intraperitoneal gas or fluid.    LYMPH NODES: Normal.    VASCULATURE: Atherosclerotic calcification of the aorta and its branches. No aneurysm.    PELVIC ORGANS: Normal.    MUSCULOSKELETAL: Degenerative disease throughout the spine. Small paraumbilical hernia containing fat.      Impression    IMPRESSION:   1.  Sigmoid diverticulitis. Inflammation is overall increased from the prior exam.  "There are 2 small mural abscesses measuring up to 1.5 cm.       Recent Labs   Lab 02/11/25  0712 02/11/25  0121   WBC 13.6* 13.8*   HGB 12.6 13.8   HCT 38.2 41.6   MCV 86 85    490*     Recent Labs   Lab 02/11/25  0712 02/11/25  0121    137   POTASSIUM 3.5 3.6   CHLORIDE 100 97*   CO2 26 28   ANIONGAP 12 12   * 108*   BUN 8.4 9.9   CR 0.74 0.73   GFRESTIMATED 90 >90   ZHANNA 8.6* 9.1   PROTTOTAL  --  7.2   ALBUMIN  --  3.6   BILITOTAL  --  0.3   ALKPHOS  --  122   AST  --  17   ALT  --  9     No results for input(s): \"INR\" in the last 168 hours.  No results for input(s): \"LACT\" in the last 168 hours.  No results for input(s): \"COLOR\", \"APPEARANCE\", \"URINEGLC\", \"URINEBILI\", \"URINEKETONE\", \"SG\", \"UBLD\", \"URINEPH\", \"PROTEIN\", \"UROBILINOGEN\", \"NITRITE\", \"LEUKEST\", \"RBCU\", \"WBCU\" in the last 168 hours.      Assessment and Plan:   Jacqueline A Goodell is a 64 year old female with a history of diverticulitis and tobacco use, seen at the request of Dr. Astudillo, who was recently admitted to [facility] from 1/30-2/1 for acute complicated sigmoid diverticulitis with an intramural abscess now admitted with persistent abdominal pain and nausea. CT A/P revealed continued sigmoid diverticulitis with increased inflammation and 2 small intramural abscesses. WBC 13.6. Abdominal exam benign without peritoneal signs. She remains hemodynamically stable so no emergent surgery indicated. Recommend continuing with conservative medical management including bowel rest, IVF, and IV antibiotics. Okay for clear liquids. Briefly discussed that as this is her second admission for the diverticulitis, if she did not improve or were to acutely worsen she may require more urgent surgery which would likely entail a sigmoid resection with a possible temporary stoma. She understood and is hopeful to avoid. She will need a colonoscopy in 6-8 weeks. Social work consult to assist with obtaining insurance coverage as she will likely require " "future procedures including a colonoscopy and possible elective surgical resection. Continue supportive cares. We will continue to follow along.     Plan:  Admit to hospitalist  Surgery: No emergent surgery indicated  Diet: Clear liquids  IV Fluids: Per hospitalist  Antibiotics:  IV Zosyn   Medications: Continue home meds per hospitalist  I&O s:  strict I&O s  Labs:   - Reviewed  - Ordered: None   Imaging:   - Dr. Higuera and myself have personally viewed: CT abd/pelvis  - Ordered: None  Activity: ambulate as tolerated, encourage OOB  DVT prophylaxis: SCD s  This plan has been discussed with Dr. Higuera    Patient specific identified risk factors considered as part of today s evaluation include: Tobacco use, recurrent diverticulitis, no insurance      Additional history obtained from the chart and patient. Level of MDM - Moderate.    Clinically Significant Risk Factors Present on Admission          # Hypochloremia: Lowest Cl = 97 mmol/L in last 2 days, will monitor as appropriate  # Hypocalcemia: Lowest Ca = 8.6 mg/dL in last 2 days, will monitor and replace as appropriate         # Hypertension: Home medication list includes antihypertensive(s)           # Obesity: Estimated body mass index is 30.89 kg/m  as calculated from the following:    Height as of 1/30/25: 1.549 m (5' 1\").    Weight as of this encounter: 74.2 kg (163 lb 8 oz).            Frieda Ac PA-C  [facility]  Phone:  [phone]    "

## 2025-02-12 LAB
ERYTHROCYTE [DISTWIDTH] IN BLOOD BY AUTOMATED COUNT: 13.7 % (ref 10–15)
HCT VFR BLD AUTO: 40.4 % (ref 35–47)
HGB BLD-MCNC: 12.9 G/DL (ref 11.7–15.7)
MAGNESIUM SERPL-MCNC: 2 MG/DL (ref 1.7–2.3)
MAGNESIUM SERPL-MCNC: 2 MG/DL (ref 1.7–2.3)
MCH RBC QN AUTO: 27.8 PG (ref 26.5–33)
MCHC RBC AUTO-ENTMCNC: 31.9 G/DL (ref 31.5–36.5)
MCV RBC AUTO: 87 FL (ref 78–100)
PLATELET # BLD AUTO: 461 10E3/UL (ref 150–450)
POTASSIUM SERPL-SCNC: 3.6 MMOL/L (ref 3.4–5.3)
RBC # BLD AUTO: 4.64 10E6/UL (ref 3.8–5.2)
WBC # BLD AUTO: 8.9 10E3/UL (ref 4–11)

## 2025-02-12 PROCEDURE — 83735 ASSAY OF MAGNESIUM: CPT | Performed by: INTERNAL MEDICINE

## 2025-02-12 PROCEDURE — 99207 PR APP CREDIT; MD BILLING SHARED VISIT: CPT | Mod: FS

## 2025-02-12 PROCEDURE — 250N000011 HC RX IP 250 OP 636: Performed by: INTERNAL MEDICINE

## 2025-02-12 PROCEDURE — 99232 SBSQ HOSP IP/OBS MODERATE 35: CPT | Performed by: INTERNAL MEDICINE

## 2025-02-12 PROCEDURE — 120N000004 HC R&B MS OVERFLOW

## 2025-02-12 PROCEDURE — 250N000013 HC RX MED GY IP 250 OP 250 PS 637: Performed by: INTERNAL MEDICINE

## 2025-02-12 PROCEDURE — 36415 COLL VENOUS BLD VENIPUNCTURE: CPT | Performed by: INTERNAL MEDICINE

## 2025-02-12 PROCEDURE — 99232 SBSQ HOSP IP/OBS MODERATE 35: CPT | Mod: FS

## 2025-02-12 PROCEDURE — 84132 ASSAY OF SERUM POTASSIUM: CPT | Performed by: INTERNAL MEDICINE

## 2025-02-12 PROCEDURE — 85014 HEMATOCRIT: CPT

## 2025-02-12 RX ORDER — AMLODIPINE BESYLATE 5 MG/1
10 TABLET ORAL DAILY
Status: DISCONTINUED | OUTPATIENT
Start: 2025-02-13 | End: 2025-02-13 | Stop reason: HOSPADM

## 2025-02-12 RX ORDER — AMLODIPINE BESYLATE 5 MG/1
5 TABLET ORAL ONCE
Status: COMPLETED | OUTPATIENT
Start: 2025-02-12 | End: 2025-02-12

## 2025-02-12 RX ADMIN — OXYCODONE HYDROCHLORIDE 2.5 MG: 5 TABLET ORAL at 09:20

## 2025-02-12 RX ADMIN — AMLODIPINE BESYLATE 5 MG: 5 TABLET ORAL at 09:12

## 2025-02-12 RX ADMIN — PIPERACILLIN AND TAZOBACTAM 3.38 G: 3; .375 INJECTION, POWDER, FOR SOLUTION INTRAVENOUS at 15:28

## 2025-02-12 RX ADMIN — PIPERACILLIN AND TAZOBACTAM 3.38 G: 3; .375 INJECTION, POWDER, FOR SOLUTION INTRAVENOUS at 09:12

## 2025-02-12 RX ADMIN — PIPERACILLIN AND TAZOBACTAM 3.38 G: 3; .375 INJECTION, POWDER, FOR SOLUTION INTRAVENOUS at 20:55

## 2025-02-12 RX ADMIN — AMLODIPINE BESYLATE 5 MG: 5 TABLET ORAL at 13:46

## 2025-02-12 RX ADMIN — CALCIUM CARBONATE (ANTACID) CHEW TAB 500 MG 1000 MG: 500 CHEW TAB at 20:54

## 2025-02-12 RX ADMIN — OXYCODONE HYDROCHLORIDE 2.5 MG: 5 TABLET ORAL at 17:49

## 2025-02-12 RX ADMIN — LISINOPRIL 20 MG: 20 TABLET ORAL at 09:12

## 2025-02-12 RX ADMIN — PIPERACILLIN AND TAZOBACTAM 3.38 G: 3; .375 INJECTION, POWDER, FOR SOLUTION INTRAVENOUS at 02:48

## 2025-02-12 ASSESSMENT — ACTIVITIES OF DAILY LIVING (ADL)
ADLS_ACUITY_SCORE: 27
ADLS_ACUITY_SCORE: 27
ADLS_ACUITY_SCORE: 26
ADLS_ACUITY_SCORE: 27
ADLS_ACUITY_SCORE: 26
ADLS_ACUITY_SCORE: 27
ADLS_ACUITY_SCORE: 26
ADLS_ACUITY_SCORE: 27
ADLS_ACUITY_SCORE: 26
ADLS_ACUITY_SCORE: 27
ADLS_ACUITY_SCORE: 27
ADLS_ACUITY_SCORE: 26
ADLS_ACUITY_SCORE: 26

## 2025-02-12 NOTE — DISCHARGE INSTRUCTIONS
"You are scheduled for a follow-up appointment on Tuesday 2/18 at 1pm with Dr. Gutierrez at:     [facility]   [address] [place] [address] [address]  [place] [address]  Phone: [phone]    Recommend colonoscopy in 6-8 weeks.     Health Insurance Resources:    [employer]:  [email or web address]      [phone] or [phone]    Hours:  Monday-Friday: 8 a.m. to 4 p.m.  Saturday-Sunday: closed    \"[facility] is [place]'s health insurance marketplace where individuals and families can shop, compare and choose health insurance coverage that meets their needs.    [facility] is the only place you can apply for financial help to lower the cost of your monthly insurance premium and out-of-pocket costs. Most [place] who enroll through [facility] qualify for financial help.     Also available to those who qualify are low-cost and free health insurance options provided through government-sponsored health insurance programs Medical Assistance and [employer]. If you qualify for and enroll in one of these programs, your health coverage is managed through the Department of Human Services.\"    [facility] [employer]:  [facility] Patient Financial Counselor:   Phone [phone]     [facility] offers assistance to clinic patients in obtaining insurance coverage and in accessing the [facility] Care noris (a fund that pays for needed care for eligible pts during gaps in coverage/access).     Free and Low-Cost Medical Clinics:     [facility] [phone]      [address] [place], [place]      Services: medical, dental, mental health, prenatal, pediatrics      Payment: most insurance, [place], sliding scale available          [facility] [phone] for medical, [phone] for dental      Visit Website: [email or web address]     Services: medical, prenatal, dental      Payment: most insurance, [place], sliding fee available           [facility] and " [facility] [phone]     Services: medical, prenatal, dental, pediatrics, after hours care,       Cost: most insurance, [place], sliding scale available         [facility] [phone]       [address] [place] [address]       Services: medical, prenatal, pediatrics, mental health, dental, diabetic education,       Cost: most insurance, [place], sliding scale available        [facility] [phone]      Various locations and hours (including evenings)      Services: family practice, well baby checks      No prenatal, dental, emergency, chronic/complicated disease care      Cost: Sliding fee, free to low-income clients          [facility] [phone]  *Billing office for sliding fee application [phone]     [address] [address]      Services: medical, prenatal, dental, mental health, pediatrics

## 2025-02-12 NOTE — PROGRESS NOTES
COLON & RECTAL SURGERY  PROGRESS NOTE    February 12, 2025    SUBJECTIVE:  Doing well. Denies much pain. No nausea or vomiting. Tolerating clear liquids. Passing gas. Loose Bm yesterday without blood. AVSS with HTN. AM labs pending.     OBJECTIVE:  Temp:  [98.4  F (36.9  C)-98.5  F (36.9  C)] 98.5  F (36.9  C)  Pulse:  [72-79] 72  Resp:  [18-20] 20  BP: (148-178)/(86-95) 178/91  SpO2:  [92 %-94 %] 94 %  No intake or output data in the 24 hours ending 02/12/25 0922    GENERAL:  Awake, alert, no acute distress, lying in bed  HEAD: Nomocephalic atraumatic  SCLERA: anicteric  EXTREMITIES: warm and well perfused  ABDOMEN:  Soft, mild LLQ tenderness, non-distended, no rebound or guarding, no peritoneal signs    LABS:  Lab Results   Component Value Date    WBC 13.6 02/11/2025    WBC 7.9 06/17/2011     Lab Results   Component Value Date    HGB 12.6 02/11/2025    HGB 11.1 06/17/2011     Lab Results   Component Value Date    HCT 38.2 02/11/2025    HCT 34.0 06/17/2011     Lab Results   Component Value Date     02/11/2025     06/17/2011     Last Basic Metabolic Panel:  Lab Results   Component Value Date     02/11/2025     06/17/2011      Lab Results   Component Value Date    POTASSIUM 3.5 02/11/2025    POTASSIUM 4.0 06/17/2011     Lab Results   Component Value Date    CHLORIDE 100 02/11/2025    CHLORIDE 106 06/17/2011     Lab Results   Component Value Date    ZHANNA 8.6 02/11/2025    ZHANNA 8.6 06/17/2011     Lab Results   Component Value Date    CO2 26 02/11/2025    CO2 28 06/17/2011     Lab Results   Component Value Date    BUN 8.4 02/11/2025    BUN 15 06/17/2011     Lab Results   Component Value Date    CR 0.74 02/11/2025    CR 0.53 06/17/2011     Lab Results   Component Value Date     02/11/2025     06/17/2011       ASSESSMENT/PLAN: Alise is a 64 year old woman who was recently admitted for complicated diverticulitis and a possible distal transverse colon mass from 1/30-2/1. She is now  readmitted with worsening abdominal pain and vomiting. Repeat CT A/P showed worsening sigmoid diverticulitis with increased inflammation and 2 small intramural abscesses.     - Full liquids  - Continue IV antibiotics  - Pain management as needed, minimize narcotics  - Encourage ambulation  - Appreciate social works assistance   - No plans for surgery. Has a follow up with Dr. Gutierrez on 2/18 to discuss elective surgery if she continues to do well this admission. She will also need a colonoscopy in 6-8 weeks.     For questions/paging, please contact the CRS office at [phone].    Frieda Ac PA-C  [facility]  Phone: [phone]

## 2025-02-12 NOTE — PLAN OF CARE
"Goal Outcome Evaluation:      Plan of Care Reviewed With: patient    Overall Patient Progress: improvingOverall Patient Progress: improving    VSS except hypertension. Afebrile. Pt stating slight pain on her abdomen. Decline pain meds. Denies n/v. Tolerating clear liquids.  Passing gas.  Up independent. PIV SL between abx's.     Plan: IV abx and advance diet as tolerated     Problem: Adult Inpatient Plan of Care  Goal: Plan of Care Review  Description: The Plan of Care Review/Shift note should be completed every shift.  The Outcome Evaluation is a brief statement about your assessment that the patient is improving, declining, or no change.  This information will be displayed automatically on your shift  note.  Outcome: Progressing  Flowsheets (Taken 2/12/2025 0418)  Plan of Care Reviewed With: patient  Overall Patient Progress: improving  Goal: Patient-Specific Goal (Individualized)  Description: You can add care plan individualizations to a care plan. Examples of Individualization might be:  \"Parent requests to be called daily at 9am for status\", \"I have a hard time hearing out of my right ear\", or \"Do not touch me to wake me up as it startles  me\".  Outcome: Progressing  Goal: Absence of Hospital-Acquired Illness or Injury  Outcome: Progressing  Intervention: Identify and Manage Fall Risk  Recent Flowsheet Documentation  Taken 2/11/2025 2201 by Gabbi Mcnulty, RN  Safety Promotion/Fall Prevention:   activity supervised   assistive device/personal items within reach   clutter free environment maintained   lighting adjusted   nonskid shoes/slippers when out of bed   room near nurse's station   room organization consistent   safety round/check completed   supervised activity   treat underlying cause  Intervention: Prevent Skin Injury  Recent Flowsheet Documentation  Taken 2/11/2025 2201 by Gabbi Mcnulty, RN  Body Position: position changed independently  Skin Protection: adhesive use limited  Intervention: " Prevent Infection  Recent Flowsheet Documentation  Taken 2/11/2025 2201 by Gabbi Mcnulty, RN  Infection Prevention:   environmental surveillance performed   equipment surfaces disinfected   hand hygiene promoted   personal protective equipment utilized   rest/sleep promoted  Goal: Optimal Comfort and Wellbeing  Outcome: Progressing  Goal: Readiness for Transition of Care  Outcome: Progressing

## 2025-02-12 NOTE — UTILIZATION REVIEW
Inpatient appropriate      Admission Status; Secondary Review Determination          Under the authority of the Utilization Management Committee, the utilization review process indicated a secondary review on the above patient. The review outcome is based on review of the medical records, discussions with staff, and applying clinical experience noted on the date of the review.       (x) Inpatient Status Appropriate - This patient's medical care is consistent with medical management for inpatient care and reasonable inpatient medical practice.       RATIONALE FOR DETERMINATION       Patient is a 64-year-old female with history of diverticulosis and recent hospitalization for sigmoid diverticulitis with small intramural phlegmon who was discharged 10 days prior to arrival and returned with worsening abdominal pain and nausea and vomiting.  She was discharged with oral Augmentin and admitted to missing a few doses of her antibiotic and not following up with colorectal surgery.  Repeat CT abdomen and pelvis on arrival showed increased inflammation from prior exam and 2 small mural abscesses measuring up to 1.5 cm.  Given the worsening diverticulitis and now 2 small mural abscesses this patient does require IV antibiotics with IV Zosyn.  The patient's abdominal pain has improved and the plan is for anticipated discharge home tomorrow 2/13.  Given the worsening findings on CT scan she does require monitoring with IV antibiotics to ensure improvement in her symptoms.  She is a very high risk patient given her noncompliance in the outpatient setting and worsening findings on CT scan.  She could be at risk for abscess rupture or even colonic perforation which could lead to sepsis or even death if not treated appropriately.  Given the above, the patient is inpatient appropriate.     At the time of admission with the information available to the attending physician more than 2 nights Hospital complex care was anticipated,  based on patient risk of adverse outcome if treated as outpatient and complex care required. Inpatient admission is appropriate based on the Medicare guidelines.       This document was produced using voice recognition software          The information on this document is developed by the utilization review team in order for the business office to ensure compliance. This only denotes the appropriateness of proper admission status and does not reflect the quality of care rendered.    The definitions of Inpatient Status and Observation Status used in making the determination above are those provided in the CMS Coverage Manual, Chapter 1 and Chapter 6, section 70.4.      Sincerely,      Meet Gorman DO  Utilization Review   Physician Advisor

## 2025-02-12 NOTE — CONSULTS
"Care Management Discharge Note    Discharge Date: 02/13/2025     Discharge Disposition:  Home    Additional Information:  CM consulted for lack of insurance and not having follow-up appointment. Per CRS note, pt is scheduled for 2/18, CM called to verify this and appt details added to AVS. Financial Counseling is automatically consulted when pt is admitted without insurance, confirmed that they have connected with pt. Additional resources placed on AVS for insurance and free/low cost clinics. CM will sign off at this time. Please call if CM/SW needs arise.     \"You are scheduled for a follow-up appointment on Tuesday 2/18 at 1pm with Dr. Gutierrez at:      [facility]   [address]  [place] [address]  Phone: [phone]     Health Insurance Resources:     [employer]:  [email or web address]      [phone] or [phone]     Hours:  Monday-Friday: 8 a.m. to 4 p.m.  Saturday-Sunday: closed     \"[facility] is [place]'s health insurance marketplace where individuals and families can shop, compare and choose health insurance coverage that meets their needs.     [facility] is the only place you can apply for financial help to lower the cost of your monthly insurance premium and out-of-pocket costs. Most [place] who enroll through [facility] qualify for financial help.      Also available to those who qualify are low-cost and free health insurance options provided through government-sponsored health insurance programs Medical Assistance and [employer]. If you qualify for and enroll in one of these programs, your health coverage is managed through the Department of Human Services.\"     [facility] [employer]:  [facility] Patient Financial Counselor:   Phone [phone]       [facility] offers assistance to clinic patients in obtaining insurance coverage and in accessing the [facility] Care noris (a fund that pays for needed care for eligible pts during gaps in " "coverage/access).      Free and Low-Cost Medical Clinics:      [facility] [phone]       [address] [place], [place]       Services: medical, dental, mental health, prenatal, pediatrics       Payment: [place] insurance, [place], sliding scale available           [facility] [phone] for medical, [phone] for dental       Visit Website: [email or web address]      Services: medical, prenatal, dental       Payment: most insurance, [place], sliding fee available            [facility]   [phone]      Services: medical, prenatal, dental, pediatrics, after hours care,        Cost: most insurance, [place], sliding scale available          [facility] [phone]        [address] [place] [address]        Services: medical, prenatal, pediatrics, mental health, dental, diabetic education,        Cost: most insurance, [place], sliding scale available          [facility] [phone]       Various locations and hours (including evenings)       Services: family practice, well baby checks       No prenatal, dental, emergency, chronic/complicated disease care       Cost: Sliding fee, free to low-income clients           [facility] [phone]  *Billing office for sliding fee application [phone]      [address] [address]       Services: medical, prenatal, dental, mental health, pediatrics \"     Cornelia Dunaway RN BSN   Inpatient Care Coordination  [facility]   Phone [phone]  "

## 2025-02-12 NOTE — PROGRESS NOTES
"[facility]    Medicine Progress Note - Hospitalist Service    Date of Admission:  2/11/2025    Primary Care Physician   Physician No Ref-Primary  CONSULTANTS: colorectal surgery    Assessment & Plan     Jacqueline A Goodell is a 64 year old female with past medical significant for asthma, ?  Hypertension, depression, diverticulosis, recent hospitalization for sigmoid diverticulitis with small intramural phlegmon discharged 10 days ago from hospital returns to the emergency room with abdominal l pain, nausea, and vomiting.  Repeat imaging shows worsening diverticulitis.        Recurrent /persistent sigmoid diverticulitis with 2 intramural abscesses  -Discharge 2/1/2025 for sigmoid diverticulitis on oral Augmentin.  Per patient reports she may have missed a few doses of antibiotics, unable to follow-up with colorectal due to lack of insurance.  Presents to the emergency room with recurrence of symptoms  -Repeat abdominal CT today 2/11/2025 shows \"Sigmoid diverticulitis. Inflammation is overall increased from the prior exam. There are 2 small mural abscesses measuring up to 1.5 cm.\"  - Patient was started on IV Zosyn in the ER, will continue  -follow up with surgery outpatient 2/18, will need plan for ongoing antibiotics   - Clear liquid diet, advance per surgery  - Colorectal consulted, no plans for surgery at this time    Essential hypertension  -continue on norvasc and lisinopril started this admission  -had a headache which is improved    Noncompliance  -patient failed to follow up  -not following care plan at home puts her at risk for readmissions, morbidity, and early mortality  -needs to get insurance, finances to see patient       Concern for infiltrative primary colonic mass  -On CT scan patient was noted to have short segment distal transverse colonic thickening which is worrisome for an infiltrative primary colonic mass.  Patient does not doctor often.  She never had a colonoscopy " "before.  She denied any weight loss.  She is a smoker.  - Patient needs outpatient colonoscopy once she gets better from diverticulitis standpoint  - Failed to follow-up with colorectal as outpatient 2/18/25  --Colorectal consulted        Hepatic cysts/hemangiomas  -This was noted on the CT.  Tiny hepatic cyst/hemangioma is present in the hepatic dome.  These require no specific follow-up.      Current tobacco use  -Counseled on smoking cessation.     Asthma  -not on medications      Depression  -not on medications                    Discussed plan of care with nursing      Diet: Clear Liquid Diet    DVT Prophylaxis: Pneumatic Compression Devices  Stoner Catheter: Not present  Lines: None     Cardiac Monitoring: None    RESTRAINTS: none   Code Status: Full Code        Follow up plan: colorectal surgery 2/18     This document was created using voice recognition technology.  Please excuse any typographical errors that may have occurred.  Please call with any questions.        Clinically Significant Risk Factors           # Hypochloremia: Lowest Cl = 97 mmol/L in last 2 days, will monitor as appropriate  # Hypocalcemia: Lowest Ca = 8.6 mg/dL in last 2 days, will monitor and replace as appropriate                    # Obesity: Estimated body mass index is 30.89 kg/m  as calculated from the following:    Height as of 1/30/25: 1.549 m (5' 1\").    Weight as of this encounter: 74.2 kg (163 lb 8 oz)., PRESENT ON ADMISSION            Disposition Plan     Expected Discharge Date: 02/13/2025                  Barrier to discharge: pain, diet  Medically Ready for Discharge: Anticipated Tomorrow         Israel Frausto MD  Hospitalist Service  [facility]  Securely message with [employer] (more info)  Text page via [employer] Paging/Directory   ______________________________________________________________________    Interval History   Patient doing a better with less abdomen pain.  Tolerating clears.  Is not happy " that no one talked to her about insurance.  Was seen by surgery no plans for surgical intervention.        ROS: A comprehensive review of systems was negative except for items noted in the HPI.  Patient currently denies any fever, chills, sweats, nausea, vomiting, diarrhea, shortness of breath, or chest pain.       Physical Exam   Vital Signs: Temp: 98.5  F (36.9  C) Temp src: Oral BP: (!) 178/91 Pulse: 72   Resp: 20 SpO2: 94 % O2 Device: None (Room air)    Weight: 163 lbs 8 oz      Exam improved, less pain  GEN:  Alert, oriented x 3, appears comfortable, no overt distress, lying in bed  HEENT:  MMM  CV:  Regular rate and rhythm, no murmur   LUNGS: Respiratory effort otherwise clear to auscultation bilaterally without rales/rhonchi/wheezing/retractions.  Good air movement     ABD:  Active bowel sounds, soft, lower abdominal tenderness improved,  no distention.  No rebound/guarding/rigidity.  EXT:  No edema.  LYN  NEURO:  Symmetric muscle strength,  No new focal deficits appreciated.            Data         Imaging:   Results for orders placed or performed during the hospital encounter of 02/11/25   CT Abdomen Pelvis w Contrast    Narrative    EXAM: CT ABDOMEN PELVIS W CONTRAST  LOCATION: [facility]  DATE: 2/11/2025    INDICATION: RLQ abdominal pain  COMPARISON: 1/30/2025.  TECHNIQUE: CT scan of the abdomen and pelvis was performed following injection of IV contrast. Multiplanar reformats were obtained. Dose reduction techniques were used.  CONTRAST: 83mL Isovue 370    FINDINGS:   LOWER CHEST: Normal.    HEPATOBILIARY: Small probable hepatic cysts.    PANCREAS: Normal.    SPLEEN: Normal.    ADRENAL GLANDS: Normal.    KIDNEYS/BLADDER: There is no hydronephrosis. Small amount of air in the urinary bladder.    BOWEL: There are colonic diverticula. There is again a segment of acute diverticulitis in the distal sigmoid colon. There is no perforation. Inflammatory change is increased since previous  exam. There are 2 small probable mural abscesses measuring up to   1.5 cm. No bowel obstruction. No free intraperitoneal gas or fluid.    LYMPH NODES: Normal.    VASCULATURE: Atherosclerotic calcification of the aorta and its branches. No aneurysm.    PELVIC ORGANS: Normal.    MUSCULOSKELETAL: Degenerative disease throughout the spine. Small paraumbilical hernia containing fat.      Impression    IMPRESSION:   1.  Sigmoid diverticulitis. Inflammation is overall increased from the prior exam. There are 2 small mural abscesses measuring up to 1.5 cm.     Procedures: none   I have personally have reviewed the patient's most up to date radiologic exams, labs, orders, and medications myself

## 2025-02-12 NOTE — PLAN OF CARE
"Goal Outcome Evaluation: Progressing    Afebrile. HTN. Received Oxycodone times two for c/o abdominal pain with relief. Abdomen tender in bilateral lower quadrants. Hyperactive bowel sounds. Intermittent small stool smears with gas. Tolerating full liquid diet. Up independently. WBC improving. Voiding. Plan: IV antibiotics. Pain control. Encourage fluids. Electrolyte replacement per protocol. Colorectal consulting.       Problem: Adult Inpatient Plan of Care  Goal: Plan of Care Review  Description: The Plan of Care Review/Shift note should be completed every shift.  The Outcome Evaluation is a brief statement about your assessment that the patient is improving, declining, or no change.  This information will be displayed automatically on your shift  note.  2/12/2025 1751 by Nan Reid RN  Outcome: Progressing  Flowsheets (Taken 2/12/2025 1751)  Plan of Care Reviewed With: patient  Overall Patient Progress: improving  2/12/2025 1750 by Nan Reid RN  Outcome: Progressing  Flowsheets (Taken 2/12/2025 1750)  Plan of Care Reviewed With: patient  Overall Patient Progress: improving  Goal: Patient-Specific Goal (Individualized)  Description: You can add care plan individualizations to a care plan. Examples of Individualization might be:  \"Parent requests to be called daily at 9am for status\", \"I have a hard time hearing out of my right ear\", or \"Do not touch me to wake me up as it startles  me\".  2/12/2025 1751 by Nan Reid RN  Outcome: Progressing  2/12/2025 1750 by Nan Reid RN  Outcome: Progressing  Goal: Absence of Hospital-Acquired Illness or Injury  2/12/2025 1751 by Nan Reid RN  Outcome: Progressing  2/12/2025 1750 by Nan Reid RN  Outcome: Progressing  Intervention: Identify and Manage Fall Risk  Recent Flowsheet Documentation  Taken 2/12/2025 0912 by Nan Reid RN  Safety Promotion/Fall Prevention:   clutter free environment maintained   " safety round/check completed  Intervention: Prevent Skin Injury  Recent Flowsheet Documentation  Taken 2/12/2025 0912 by Nan Reid RN  Body Position: position changed independently  Skin Protection: adhesive use limited  Intervention: Prevent and Manage VTE (Venous Thromboembolism) Risk  Recent Flowsheet Documentation  Taken 2/12/2025 0912 by Nan Reid RN  VTE Prevention/Management: SCDs off (sequential compression devices)  Intervention: Prevent Infection  Recent Flowsheet Documentation  Taken 2/12/2025 0912 by Nan Reid RN  Infection Prevention:   hand hygiene promoted   personal protective equipment utilized   rest/sleep promoted   single patient room provided  Goal: Optimal Comfort and Wellbeing  2/12/2025 1751 by Nan Reid RN  Outcome: Progressing  2/12/2025 1750 by Nan Reid RN  Outcome: Progressing  Intervention: Monitor Pain and Promote Comfort  Recent Flowsheet Documentation  Taken 2/12/2025 0920 by Nan Reid RN  Pain Management Interventions: medication (see MAR)  Goal: Readiness for Transition of Care  2/12/2025 1751 by Nan Reid RN  Outcome: Progressing  2/12/2025 1750 by Nan Reid RN  Outcome: Progressing     Problem: Skin Injury Risk Increased  Goal: Skin Health and Integrity  2/12/2025 1751 by Nan Reid RN  Outcome: Progressing  2/12/2025 1750 by Nan Reid RN  Outcome: Progressing  Intervention: Optimize Skin Protection  Recent Flowsheet Documentation  Taken 2/12/2025 0912 by Nan Reid RN  Skin Protection: adhesive use limited  Activity Management: up ad ramona  Head of Bed (HOB) Positioning: HOB at 20-30 degrees  Intervention: Promote and Optimize Oral Intake  Recent Flowsheet Documentation  Taken 2/12/2025 0912 by Nan Reid RN  Nutrition Interventions: diet advanced

## 2025-02-13 VITALS
OXYGEN SATURATION: 95 % | SYSTOLIC BLOOD PRESSURE: 170 MMHG | RESPIRATION RATE: 16 BRPM | WEIGHT: 163.14 LBS | DIASTOLIC BLOOD PRESSURE: 99 MMHG | HEART RATE: 82 BPM | TEMPERATURE: 98.2 F | BODY MASS INDEX: 30.83 KG/M2

## 2025-02-13 LAB
MAGNESIUM SERPL-MCNC: 2 MG/DL (ref 1.7–2.3)
POTASSIUM SERPL-SCNC: 3.6 MMOL/L (ref 3.4–5.3)

## 2025-02-13 PROCEDURE — 36415 COLL VENOUS BLD VENIPUNCTURE: CPT | Performed by: INTERNAL MEDICINE

## 2025-02-13 PROCEDURE — 99207 PR APP CREDIT; MD BILLING SHARED VISIT: CPT | Mod: FS

## 2025-02-13 PROCEDURE — 250N000011 HC RX IP 250 OP 636: Performed by: INTERNAL MEDICINE

## 2025-02-13 PROCEDURE — 250N000013 HC RX MED GY IP 250 OP 250 PS 637: Performed by: INTERNAL MEDICINE

## 2025-02-13 PROCEDURE — 99239 HOSP IP/OBS DSCHRG MGMT >30: CPT | Performed by: INTERNAL MEDICINE

## 2025-02-13 PROCEDURE — 84132 ASSAY OF SERUM POTASSIUM: CPT | Performed by: INTERNAL MEDICINE

## 2025-02-13 PROCEDURE — 83735 ASSAY OF MAGNESIUM: CPT | Performed by: INTERNAL MEDICINE

## 2025-02-13 PROCEDURE — 99232 SBSQ HOSP IP/OBS MODERATE 35: CPT | Mod: FS | Performed by: COLON & RECTAL SURGERY

## 2025-02-13 RX ORDER — OXYCODONE HYDROCHLORIDE 5 MG/1
2.5 TABLET ORAL EVERY 4 HOURS PRN
Qty: 5 TABLET | Refills: 0 | Status: SHIPPED | OUTPATIENT
Start: 2025-02-13

## 2025-02-13 RX ORDER — METRONIDAZOLE 500 MG/1
500 TABLET ORAL 3 TIMES DAILY
Qty: 42 TABLET | Refills: 0 | Status: SHIPPED | OUTPATIENT
Start: 2025-02-13 | End: 2025-02-27

## 2025-02-13 RX ORDER — AMLODIPINE BESYLATE 10 MG/1
10 TABLET ORAL DAILY
Qty: 30 TABLET | Refills: 0 | Status: SHIPPED | OUTPATIENT
Start: 2025-02-13 | End: 2025-03-15

## 2025-02-13 RX ORDER — CIPROFLOXACIN 500 MG/1
500 TABLET, FILM COATED ORAL 2 TIMES DAILY
Qty: 28 TABLET | Refills: 0 | Status: SHIPPED | OUTPATIENT
Start: 2025-02-13 | End: 2025-02-27

## 2025-02-13 RX ORDER — LISINOPRIL 20 MG/1
20 TABLET ORAL DAILY
Qty: 30 TABLET | Refills: 0 | Status: SHIPPED | OUTPATIENT
Start: 2025-02-13 | End: 2025-03-15

## 2025-02-13 RX ADMIN — PIPERACILLIN AND TAZOBACTAM 3.38 G: 3; .375 INJECTION, POWDER, FOR SOLUTION INTRAVENOUS at 08:45

## 2025-02-13 RX ADMIN — LISINOPRIL 20 MG: 20 TABLET ORAL at 08:45

## 2025-02-13 RX ADMIN — AMLODIPINE BESYLATE 10 MG: 5 TABLET ORAL at 08:44

## 2025-02-13 RX ADMIN — PIPERACILLIN AND TAZOBACTAM 3.38 G: 3; .375 INJECTION, POWDER, FOR SOLUTION INTRAVENOUS at 02:47

## 2025-02-13 ASSESSMENT — ACTIVITIES OF DAILY LIVING (ADL)
ADLS_ACUITY_SCORE: 26

## 2025-02-13 NOTE — PROGRESS NOTES
Colon and Rectal Surgery  Daily Progress Note    Subjective  Patient reports she is doing okay this morning. Mild abdominal pain. She is tolerating low fiber and denies n/v. Passing gas, but no BM today. Discussed follow up, which is scheduled for next Tuesday 2/18. HTN to 170/99 this AM otherwise VSS.     Objective  Intake/Output last 24 hrs:    Intake/Output Summary (Last 24 hours) at 2/13/2025 0952  Last data filed at 2/13/2025 0342  Gross per 24 hour   Intake 720 ml   Output --   Net 720 ml     Temp:  [98.2  F (36.8  C)-98.9  F (37.2  C)] 98.2  F (36.8  C)  Pulse:  [68-83] 82  Resp:  [16-18] 16  BP: (153-176)/() 170/99  SpO2:  [93 %-95 %] 95 %    Physical Exam:  General: awake, alert, lying in bed, in no acute distress  Head: normocephalic, atraumatic  Respiratory: non-labored breathing  Abdomen: soft, mildly tender, non-distended  Skin: No rashes or lesions  Musculoskeletal: moves all four extremities equally  Psychological: alert and oriented, answers questions appropriately    Pertinent Labs  Lab Results: personally reviewed.  Lab Results   Component Value Date     02/11/2025     02/11/2025     02/01/2025     06/17/2011    CO2 26 02/11/2025    CO2 28 02/11/2025    CO2 28 02/01/2025    CO2 28 06/17/2011    BUN 8.4 02/11/2025    BUN 9.9 02/11/2025    BUN 4.6 02/01/2025    BUN 15 06/17/2011     Lab Results   Component Value Date    WBC 8.9 02/12/2025    WBC 13.6 02/11/2025    WBC 13.8 02/11/2025    WBC 7.9 06/17/2011    HGB 12.9 02/12/2025    HGB 12.6 02/11/2025    HGB 13.8 02/11/2025    HGB 11.1 06/17/2011    HCT 40.4 02/12/2025    HCT 38.2 02/11/2025    HCT 41.6 02/11/2025    HCT 34.0 06/17/2011    MCV 87 02/12/2025    MCV 86 02/11/2025    MCV 85 02/11/2025    MCV 89 06/17/2011     02/12/2025     02/11/2025     02/11/2025     06/17/2011       Assessment/Plan: Alise is a 64 year old woman who was recently admitted for complicated diverticulitis and a  possible distal transverse colon mass from 1/30-2/1. She is now readmitted with worsening abdominal pain and vomiting. Repeat CT A/P showed worsening sigmoid diverticulitis with increased inflammation and 2 small intramural abscesses.     - Low fiber diet   - Continue IV antibiotics  - Pain management as needed, minimize narcotics  - Encourage ambulation  - Appreciate social works assistance   - No plans for surgery. Has a follow up with Dr. Gutierrez on 2/18 to discuss elective surgery if she continues to do well this admission. She will also need a colonoscopy in 6-8 weeks.       Will discuss with Dr. Gutierrez and Dr. Higuera and addend with any changes.     Key Root PA-C  [facility]  [phone]..............................main     Epidermal Sutures: 5-0 Fast Absorbing Gut

## 2025-02-13 NOTE — PLAN OF CARE
"Goal Outcome Evaluation:      Plan of Care Reviewed With: patient    Overall Patient Progress: improvingOverall Patient Progress: improving    Orientation: Alert and oriented x4  VSS. BP elevated.   LS: Clear   GI: Abdomen soft. Tenderness present in lower quadrants. Hyperactive BS.  Passing gas. No BM. Denies N/V. Pt tolerating full liquid diet. Pt reporting heartburn. PRN Tums given.  : Adequate urine output.   Skin: wnl   Activity: Independent.. Pt slept comfortably throughout shift.   Pain: Pt denies pain 0/10.   Updates/Plan: IV zosyn. Continue with current cares.    Problem: Adult Inpatient Plan of Care  Goal: Plan of Care Review  Description: The Plan of Care Review/Shift note should be completed every shift.  The Outcome Evaluation is a brief statement about your assessment that the patient is improving, declining, or no change.  This information will be displayed automatically on your shift  note.  Outcome: Progressing  Flowsheets (Taken 2/13/2025 0443)  Plan of Care Reviewed With: patient  Overall Patient Progress: improving  Goal: Patient-Specific Goal (Individualized)  Description: You can add care plan individualizations to a care plan. Examples of Individualization might be:  \"Parent requests to be called daily at 9am for status\", \"I have a hard time hearing out of my right ear\", or \"Do not touch me to wake me up as it startles  me\".  Outcome: Progressing  Goal: Absence of Hospital-Acquired Illness or Injury  Outcome: Progressing  Intervention: Identify and Manage Fall Risk  Recent Flowsheet Documentation  Taken 2/12/2025 2100 by Alejandra Roberto RN  Safety Promotion/Fall Prevention:   assistive device/personal items within reach   clutter free environment maintained   nonskid shoes/slippers when out of bed   room near nurse's station   room organization consistent   safety round/check completed   treat underlying cause  Intervention: Prevent Skin Injury  Recent Flowsheet Documentation  Taken " 2/12/2025 2100 by Alejandra Roberto RN  Body Position: position changed independently  Skin Protection: adhesive use limited  Intervention: Prevent Infection  Recent Flowsheet Documentation  Taken 2/12/2025 2100 by Alejandra Roberto RN  Infection Prevention:   equipment surfaces disinfected   rest/sleep promoted   hand hygiene promoted  Goal: Optimal Comfort and Wellbeing  Outcome: Progressing  Goal: Readiness for Transition of Care  Outcome: Progressing

## 2025-02-13 NOTE — DISCHARGE SUMMARY
"[facility]  Hospitalist Discharge Summary      Date of Admission:  2/11/2025  Date of Discharge:  2/13/2025  Discharging Provider: Israel Frausto MD  Discharge Service: Hospitalist Service  Primary Care Physician   Physician No Ref-Primary    Discharge Diagnoses   Persistent sigmoid diverticulitis  2 intramural abscesses  Essential hypertension  Noncompliance  Lack of insurance  Concern for colonic mass  Hepatic cysts  Hemangiomas  Tobacco abuse  Asthma  Depression     Hospital Course   Jacqueline A Goodell is a 64 year old female with past medical significant for asthma, ?  Hypertension, depression, diverticulosis, recent hospitalization for sigmoid diverticulitis with small intramural phlegmon discharged 10 days ago from hospital returns to the emergency room with abdominal l pain, nausea, and vomiting.  Repeat imaging showed worsening diverticulitis.        Recurrent /persistent sigmoid diverticulitis with 2 intramural abscesses  -Discharge 2/1/2025 for sigmoid diverticulitis on oral Augmentin.  Per patient reports she may have missed a few doses of antibiotics, unable to follow-up with colorectal due to lack of insurance.  Presented to the emergency room with recurrence of symptoms  -Repeat abdominal CT today 2/11/2025 shows \"Sigmoid diverticulitis. Inflammation is overall increased from the prior exam. There are 2 small mural abscesses measuring up to 1.5 cm.\"  - Patient was started on IV Zosyn in the ER   -follow up with surgery outpatient 2/18, will need plan for ongoing antibiotics.  Will change to cirpo/flagyl for 2 weeks at home as she failed augmentin  -needs colonoscopy in 6-8 weeks  -patient was able to advance her diet  - Colorectal consulted, no plans for surgery at this time     Essential hypertension  -patient was not on medications, new this admission  -started on and will continue on norvasc and lisinopril started this admission  -needs primary care provider follow up for " "blood pressure check and BMP   -had a headache which is improved     Noncompliance, lack of insurance  -patient failed to follow up   -has follow up 2/18 with colorectal surgery  -not following her care plan at home puts her at risk for readmissions, morbidity, and early mortality  -needs to get insurance, finances saw and gave resources  -patient I suspect will not follow up, high risk of this     Concern for infiltrative primary colonic mass  -On CT scan patient was noted to have short segment distal transverse colonic thickening which is worrisome for an infiltrative primary colonic mass.  Patient does not doctor often.  She never had a colonoscopy before.  She denied any weight loss.  She is a smoker.  - Patient needs outpatient colonoscopy once she gets better from diverticulitis standpoint 6-8 weeks  - Failed to follow-up with colorectal as outpatient 2/18/25 with them  --Colorectal consulted  --patient was told the importance of follow up as if she does not may lead to her death      Hepatic cysts/hemangiomas  -This was noted on the CT.  Tiny hepatic cyst/hemangioma is present in the hepatic dome.  These require no specific follow-up.      Current tobacco use  -Counseled on smoking cessation.     Asthma  -not on medications      Depression  -not on medications     Clinically Significant Risk Factors     # Obesity: Estimated body mass index is 30.83 kg/m  as calculated from the following:    Height as of 1/30/25: 1.549 m (5' 1\").    Weight as of this encounter: 74 kg (163 lb 2.3 oz).       Significant Results and Procedures   Most Recent 3 CBC's:  Recent Labs   Lab Test 02/12/25  0921 02/11/25  0712 02/11/25  0121   WBC 8.9 13.6* 13.8*   HGB 12.9 12.6 13.8   MCV 87 86 85   * 445 490*     Most Recent 3 BMP's:  Recent Labs   Lab Test 02/13/25  0530 02/12/25  0921 02/11/25  0712 02/11/25  0121 02/01/25  0540   NA  --   --  138 137 141   POTASSIUM 3.6 3.6 3.5 3.6 3.5   CHLORIDE  --   --  100 97* 102   CO2  " --   --  26 28 28   BUN  --   --  8.4 9.9 4.6*   CR  --   --  0.74 0.73 0.69   ANIONGAP  --   --  12 12 11   ZHANNA  --   --  8.6* 9.1 8.6*   GLC  --   --  100* 108* 106*   ,   Results for orders placed or performed during the hospital encounter of 02/11/25   CT Abdomen Pelvis w Contrast    Narrative    EXAM: CT ABDOMEN PELVIS W CONTRAST  LOCATION: [facility]  DATE: 2/11/2025    INDICATION: RLQ abdominal pain  COMPARISON: 1/30/2025.  TECHNIQUE: CT scan of the abdomen and pelvis was performed following injection of IV contrast. Multiplanar reformats were obtained. Dose reduction techniques were used.  CONTRAST: 83mL Isovue 370    FINDINGS:   LOWER CHEST: Normal.    HEPATOBILIARY: Small probable hepatic cysts.    PANCREAS: Normal.    SPLEEN: Normal.    ADRENAL GLANDS: Normal.    KIDNEYS/BLADDER: There is no hydronephrosis. Small amount of air in the urinary bladder.    BOWEL: There are colonic diverticula. There is again a segment of acute diverticulitis in the distal sigmoid colon. There is no perforation. Inflammatory change is increased since previous exam. There are 2 small probable mural abscesses measuring up to   1.5 cm. No bowel obstruction. No free intraperitoneal gas or fluid.    LYMPH NODES: Normal.    VASCULATURE: Atherosclerotic calcification of the aorta and its branches. No aneurysm.    PELVIC ORGANS: Normal.    MUSCULOSKELETAL: Degenerative disease throughout the spine. Small paraumbilical hernia containing fat.      Impression    IMPRESSION:   1.  Sigmoid diverticulitis. Inflammation is overall increased from the prior exam. There are 2 small mural abscesses measuring up to 1.5 cm.            Follow up/instructions: patient needs to establish primary care provider in a week for blood pressure check and to get a BMP as she now is on a ace inhibitor.  She is see colorectal surgery 2/18 as scheduled and will need to be set up with a colonoscopy thereafter.     Pending test results at  discharge:      Unresulted Labs Ordered in the Past 30 Days of this Admission       No orders found from 1/12/2025 to 2/12/2025.             Discharge Orders      Reason for your hospital stay    Diverticulitis and abscess     Follow-up and recommended labs and tests     Follow up with primary care provider, Physician No Ref-Primary, within 7 days for hospital follow- up.  Follow up labs or test: check BMP as now on lisinopril.  Follow up blood pressure    Follow up with colorectal surgery as scheduled Dr Gutierrez on 2/18/25    Follow up with colonoscopy 6-8 weekds     Activity    Your activity upon discharge: activity as tolerated     Follow-up and recommended labs and tests     Follow up with [facility] next week 2/18 at 1:00 PM at our [place] Office. Our address is [address] [place] [address] [address], [place] [address]. You may call us at [phone]     Diet    Follow this diet upon discharge: Current Diet:Orders Placed This Encounter      Low Fiber Diet       Discharge Disposition   Discharged to home  Condition at discharge: Stable      Consultations This Hospital Stay   COLORECTAL SURGERY IP CONSULT  CARE MANAGEMENT / SOCIAL WORK IP CONSULT    Code Status   Full Code    Time Spent on this Encounter   I, Israel Frausto MD, personally saw the patient today and spent greater than 30 minutes discharging this patient.    Home if tolerates eating.  Discussed with nursing        This document was created using voice recognition technology.  Please excuse any typographical errors that may have occurred.  Please call with any questions.       Israel Frausto MD  [facility]  [address] [facility] [address]  Phone: [phone]  Fax: [phone]  ______________________________________________________________________    Physical Exam   Vital Signs: Temp: 98.2  F (36.8  C) Temp src: Oral BP: (!) 170/99 Pulse: 82   Resp: 16 SpO2: 95 % O2 Device: None (Room air)    Weight:  163 lbs 2.25 oz      Exam improved, less pain,  doing better  GEN:  Alert, oriented x 3, appears comfortable, no overt distress, lying in bed  HEENT:  MMM  CV:  Regular rate and rhythm, no murmur   LUNGS: Respiratory effort otherwise clear to auscultation bilaterally without rales/rhonchi/wheezing/retractions.  Good air movement     ABD:  Active bowel sounds, soft, minimal lower abdominal tenderness much improved,  no distention.  No rebound/guarding/rigidity.  EXT:  No edema.  LYN  NEURO:  Symmetric muscle strength,  No new focal deficits appreciated.      Discharge Medications   Current Discharge Medication List        START taking these medications    Details   amLODIPine (NORVASC) 10 MG tablet Take 1 tablet (10 mg) by mouth daily.  Qty: 30 tablet, Refills: 0    Associated Diagnoses: Essential hypertension      ciprofloxacin (CIPRO) 500 MG tablet Take 1 tablet (500 mg) by mouth 2 times daily for 14 days.  Qty: 28 tablet, Refills: 0    Associated Diagnoses: Diverticulitis of colon with perforation      metroNIDAZOLE (FLAGYL) 500 MG tablet Take 1 tablet (500 mg) by mouth 3 times daily for 14 days.  Qty: 42 tablet, Refills: 0    Associated Diagnoses: Diverticulitis of colon with perforation      oxyCODONE (ROXICODONE) 5 MG tablet Take 0.5 tablets (2.5 mg) by mouth every 4 hours as needed for moderate pain (use if other nonopiate orals  not working, may use for head ache).  Qty: 5 tablet, Refills: 0    Associated Diagnoses: Diverticulitis of colon with perforation           CONTINUE these medications which have CHANGED    Details   lisinopril (ZESTRIL) 20 MG tablet Take 1 tablet (20 mg) by mouth daily.  Qty: 30 tablet, Refills: 0    Associated Diagnoses: Essential hypertension           CONTINUE these medications which have NOT CHANGED    Details   acetaminophen (TYLENOL) 325 MG tablet Take 3 tablets (975 mg) by mouth every 8 hours as needed for mild pain or fever.    Associated Diagnoses: Diverticulitis of colon  with perforation           STOP taking these medications       amoxicillin-clavulanate (AUGMENTIN) 875-125 MG tablet Comments:   Reason for Stopping:             Allergies   Allergies   Allergen Reactions    Codeine     Hydrocodone Nausea and Vomiting

## 2025-02-13 NOTE — PLAN OF CARE
"Goal Outcome Evaluation:      Plan of Care Reviewed With: patient    Overall Patient Progress: improvingOverall Patient Progress: improving         Afebrile. Slight \"stomach ache\" after eating low fiber diet but denies need for pain medication; Provider aware and okay for pt to discharge. Abdomen tender in bilateral lower quadrants. Hyperactive bowel sounds. Passing flatus. HTN; receiving anti-hypertensive medications. Up independently. Voiding. Discharge instructions complete and verbal understanding given by pt. Will discharge to home when ride arrives.     Problem: Adult Inpatient Plan of Care  Goal: Plan of Care Review  Description: The Plan of Care Review/Shift note should be completed every shift.  The Outcome Evaluation is a brief statement about your assessment that the patient is improving, declining, or no change.  This information will be displayed automatically on your shift  note.  Outcome: Met  Flowsheets (Taken 2/13/2025 1243)  Plan of Care Reviewed With: patient  Overall Patient Progress: improving  Goal: Patient-Specific Goal (Individualized)  Description: You can add care plan individualizations to a care plan. Examples of Individualization might be:  \"Parent requests to be called daily at 9am for status\", \"I have a hard time hearing out of my right ear\", or \"Do not touch me to wake me up as it startles  me\".  Outcome: Met  Goal: Absence of Hospital-Acquired Illness or Injury  Outcome: Met  Intervention: Identify and Manage Fall Risk  Recent Flowsheet Documentation  Taken 2/13/2025 0841 by Nan Reid, RN  Safety Promotion/Fall Prevention:   clutter free environment maintained   safety round/check completed  Intervention: Prevent Skin Injury  Recent Flowsheet Documentation  Taken 2/13/2025 0841 by Nan Reid RN  Body Position: position changed independently  Skin Protection: adhesive use limited  Intervention: Prevent and Manage VTE (Venous Thromboembolism) Risk  Recent Flowsheet " Documentation  Taken 2/13/2025 0841 by Nan Reid RN  VTE Prevention/Management: SCDs off (sequential compression devices)  Intervention: Prevent Infection  Recent Flowsheet Documentation  Taken 2/13/2025 0841 by Nan Reid RN  Infection Prevention:   hand hygiene promoted   personal protective equipment utilized   rest/sleep promoted   single patient room provided  Goal: Optimal Comfort and Wellbeing  Outcome: Met  Goal: Readiness for Transition of Care  Outcome: Met     Problem: Skin Injury Risk Increased  Goal: Skin Health and Integrity  Outcome: Met  Intervention: Optimize Skin Protection  Recent Flowsheet Documentation  Taken 2/13/2025 0841 by Nan Reid RN  Skin Protection: adhesive use limited  Activity Management: up ad ramona  Head of Bed (HOB) Positioning: HOB at 30-45 degrees  Intervention: Promote and Optimize Oral Intake  Recent Flowsheet Documentation  Taken 2/13/2025 0841 by Nan Reid RN  Nutrition Interventions: diet advanced

## 2025-02-15 ENCOUNTER — PATIENT OUTREACH (OUTPATIENT)
Dept: CARE COORDINATION | Facility: CLINIC | Age: 65
End: 2025-02-15

## 2025-02-15 NOTE — PROGRESS NOTES
Connected Care Resource Center:   [facility] Resource Center Contact  [facility]/Voicemail     Clinical Data: Post-Discharge Outreach     Outreach attempted x 2.  Left message on patient's voicemail, providing [facility]'s central phone number of [phone] ([phone]) for questions/concerns and/or to schedule an appt with an [facility] provider, if they do not have a PCP.      Plan:  [facility] will do no further outreaches at this time.       JANIE Haider  Connected Care Resource [place], [facility]    *Connected Care Resource Team does NOT follow patient ongoing. Referrals are identified based on internal discharge reports and the outreach is to ensure patient has an understanding of their discharge instructions.

## 2025-03-01 ENCOUNTER — HEALTH MAINTENANCE LETTER (OUTPATIENT)
Age: 65
End: 2025-03-01